# Patient Record
Sex: FEMALE | Race: BLACK OR AFRICAN AMERICAN | Employment: OTHER | RURAL
[De-identification: names, ages, dates, MRNs, and addresses within clinical notes are randomized per-mention and may not be internally consistent; named-entity substitution may affect disease eponyms.]

---

## 2017-01-25 ENCOUNTER — OFFICE VISIT (OUTPATIENT)
Dept: FAMILY MEDICINE CLINIC | Age: 82
End: 2017-01-25

## 2017-01-25 VITALS
TEMPERATURE: 97.9 F | HEIGHT: 56 IN | BODY MASS INDEX: 43.87 KG/M2 | RESPIRATION RATE: 16 BRPM | DIASTOLIC BLOOD PRESSURE: 72 MMHG | HEART RATE: 92 BPM | WEIGHT: 195 LBS | OXYGEN SATURATION: 97 % | SYSTOLIC BLOOD PRESSURE: 164 MMHG

## 2017-01-25 DIAGNOSIS — Z13.39 SCREENING FOR ALCOHOLISM: ICD-10-CM

## 2017-01-25 DIAGNOSIS — I48.0 PAROXYSMAL ATRIAL FIBRILLATION (HCC): ICD-10-CM

## 2017-01-25 DIAGNOSIS — E78.00 PURE HYPERCHOLESTEROLEMIA: ICD-10-CM

## 2017-01-25 DIAGNOSIS — Z00.00 ROUTINE GENERAL MEDICAL EXAMINATION AT A HEALTH CARE FACILITY: Primary | ICD-10-CM

## 2017-01-25 DIAGNOSIS — I10 ESSENTIAL HYPERTENSION: ICD-10-CM

## 2017-01-25 DIAGNOSIS — I25.10 CORONARY ARTERY DISEASE INVOLVING NATIVE CORONARY ARTERY OF NATIVE HEART WITHOUT ANGINA PECTORIS: ICD-10-CM

## 2017-01-25 DIAGNOSIS — Z13.31 SCREENING FOR DEPRESSION: ICD-10-CM

## 2017-01-25 DIAGNOSIS — Z79.01 ANTICOAGULANT LONG-TERM USE: ICD-10-CM

## 2017-01-25 PROBLEM — Z71.89 ADVANCE DIRECTIVE DISCUSSED WITH PATIENT: Status: ACTIVE | Noted: 2017-01-25

## 2017-01-25 RX ORDER — AMLODIPINE BESYLATE 5 MG/1
5 TABLET ORAL DAILY
Qty: 90 TAB | Refills: 3 | Status: SHIPPED | OUTPATIENT
Start: 2017-01-25 | End: 2017-08-04 | Stop reason: DRUGHIGH

## 2017-01-25 NOTE — PROGRESS NOTES
Blu Alexander is a 80 y.o. female presenting for/with: Annual Wellness Visit and Hip Pain    HPI:  Had acute influenza. Felt feverish, coughing, sore. Went to [facility] ED, had POS flu test for influenza A and was tx with tamiflu with rapid recovery. Back to normal now. R leg pain  Pt had intense pain to the R leg yesterday. Told was DJD after some xrays and an exam. Tx with a pain pill in ED (not sure what), ordered for tramadol (didn't fill) and told to f/u with us. Better today. Ran out of her norco 5's late last mo from #90 fill 2/2016. Hypertension. Blood pressures have been improving a little. Management at last visit included con't coreg 25 BID and lisinopril HCT to 2x 20/12.5 every day, and adding norvasc 2. Every day. Taking regularly, gina well. Current regimen: ACE inhibitor, beta-blocker, CCB, and thiazide diuretic. Symptoms include no sx lately. Patient denies chest pain, palpitations. Lab review:   Lab Results   Component Value Date/Time    Sodium 141 11/07/2016 12:04 PM    Potassium 4.2 11/07/2016 12:04 PM    Chloride 104 11/07/2016 12:04 PM    CO2 22 11/07/2016 12:04 PM    Glucose 85 11/07/2016 12:04 PM    BUN 28 11/07/2016 12:04 PM    Creatinine 0.86 11/07/2016 12:04 PM    BUN/Creatinine ratio 33 11/07/2016 12:04 PM    GFR est AA 71 11/07/2016 12:04 PM    GFR est non-AA 61 11/07/2016 12:04 PM    Calcium 9.2 11/07/2016 12:04 PM     CMP at [facility] showed GFRaa >60, Cr 1, Na+ a little low at 132. LFT's ok. Atrial fibrillation, intermittent. Current symptoms: none  Current control agent: B-blocker  Current anticoagulant: ASA 81mg every day and plavix 75mg every day. History of bleeding problems: GI bleed 9/2015, with drop in HCT.    Lab Results   Component Value Date/Time    WBC 7.4 03/08/2016 11:31 AM    HGB 12.8 03/08/2016 11:31 AM    HCT 39.7 03/08/2016 11:31 AM    PLATELET 853 80/74/1284 11:31 AM    MCV 96 03/08/2016 11:31 AM     H/H at [facility] ER 1/2017 13.3/40    CHD  No further hx bleed since 9/2015. Remains on ASA 81 and plavix (con't by cardio), b-blocker, statin, and diuretic. Trino well. No myalgias, arthralgias, unusual weakness. Does have increased risk of recurrent GI bleed on double antiplt, and is on pepcid for GI prophy. No sx lately. Lab Results   Component Value Date/Time    Cholesterol, total 122 03/08/2016 11:31 AM    HDL Cholesterol 65 03/08/2016 11:31 AM    LDL, calculated 45 03/08/2016 11:31 AM    VLDL, calculated 12 03/08/2016 11:31 AM    Triglyceride 59 03/08/2016 11:31 AM     Lab Results   Component Value Date/Time    ALT 21 03/08/2016 11:31 AM    AST 27 03/08/2016 11:31 AM    Alk. phosphatase 102 03/08/2016 11:31 AM    Bilirubin, total 0.4 03/08/2016 11:31 AM     LFT's normal on check at [address] [place] [address] 1/2017. PMH, SH, Medications/Allergies: reviewed, on chart. ROS:  Constitutional: No fever, chills or weight loss  Respiratory: No cough, SOB   CV: No chest pain, rare palpitations, no change.     Visit Vitals    /72    Pulse 92    Temp 97.9 °F (36.6 °C) (Oral)    Resp 16    Ht 4' 8\" (1.422 m)    Wt 195 lb (88.5 kg)    SpO2 97%    BMI 43.72 kg/m2     Wt Readings from Last 3 Encounters:   01/25/17 195 lb (88.5 kg)   11/07/16 198 lb (89.8 kg)   09/07/16 199 lb (90.3 kg)     BP Readings from Last 3 Encounters:   01/25/17 164/72   11/07/16 163/75   09/07/16 (!) 149/100     Physical Examination: General appearance - alert, well appearing, and in no distress  Mental status - alert, oriented to person, place, and time  Eyes - pupils equal and reactive, extraocular eye movements intact  ENT - bilateral external ears and nose normal. Normal lips  Neck - supple, no significant adenopathy, no thyromegaly or mass  Lymphatics - no palpable lymphadenopathy, no hepatosplenomegaly  Chest - clear to auscultation, no wheezes, rales or rhonchi, symmetric air entry  Heart - normal rate, regular rhythm, normal S1, S2, no murmurs, rubs, clicks or gallops  Extremities - peripheral pulses normal, trace pedal edema, no clubbing or cyanosis    A/P:  HTN  Still not in goal. Boost norvasc to 5mg daily. Con't coreg 25 BID, lisinopril HCT 20/12.5mg 2qday, . CHD and HLD  Stable since last visit, no anemia. gina meds well. Work on RF reduction. Plan lipids in late winter/early spring 2017. Afib with hx GI bleed hx, resolved. Blood count good at last check. Has been 2yr since last stent placed. Con't pepcid for GI prophy. Monitor. DJD  Doing ok lately. using norco 5/325 sparingly. Has plenty from #90 fill 9/16. If want therapy for her hip, plan refer to [facility] per pt pref. F/U 2mo.    ______________________________________________________________________    Hugo Contreras is a 80 y.o. female and presents for annual Medicare Wellness Visit. Problem List: Reviewed with patient and discussed risk factors. Patient Active Problem List   Diagnosis Code    Degenerative arthritis of left shoulder region M19.012    S/P hysterectomy Z90.710    Hyperlipidemia E78.5    HTN (hypertension) I10    OA (osteoarthritis) M19.90    Lumbago with sciatica M54.40    Obesity E66.9    DVT (deep venous thrombosis) (HCC) I82.409    Coronary artery disease involving native coronary artery without angina pectoris I25.10    Paroxysmal atrial fibrillation (HCC) I48.0    Anticoagulant long-term use Z79.01       Current medical providers:  Patient Care Team:  Dino Libman, MD as PCP - General (Family Practice)    PMH, , Medications/Allergies: reviewed, on chart. Female Alcohol Screening: On any occasion during the past 3 months, have you had more than 3 drinks containing alcohol? No    Do you average more than 7 drinks per week?   No  ROS:  Constitutional: No fever, chills or weight loss  Respiratory: No cough, SOB   CV: No chest pain or Palpitations    Objective:  Visit Vitals    /72    Pulse 92    Temp 97.9 °F (36.6 °C) (Oral)    Resp 16    Ht 4' 8\" (1.422 m)    Wt 195 lb (88.5 kg)    SpO2 97%    BMI 43.72 kg/m2    Body mass index is 43.72 kg/(m^2). Assessment of cognitive impairment: Alert and oriented x 3    Depression Screen:   PHQ 2 / 9, over the last two weeks 1/25/2017   Little interest or pleasure in doing things Not at all   Feeling down, depressed or hopeless Not at all   Total Score PHQ 2 0       Fall Risk Assessment:    Fall Risk Assessment, last 12 mths 1/25/2017   Able to walk? Yes   Fall in past 12 months? No       Functional Ability:   Does the patient exhibit a steady gait? yes   How long did it take the patient to get up and walk from a sitting position? 2s   Is the patient self reliant?  (ie can do own laundry, meals, household chores)  yes     Does the patient handle his/her own medications? yes     Does the patient handle his/her own money? yes     Is the patients home safe (ie good lighting, handrails on stairs and bath, etc.)? yes     Did you notice or did patient express any hearing difficulties? no     Did you notice or did patient express any vision difficulties? no       Advance Care Planning:   Patient was offered the opportunity to discuss advance care planning:  yes     Does patient have an Advance Directive:  no   If no, did you provide information on Caring Connections? yes     Plan:      Orders Placed This Encounter    Depression Screen Annual    amLODIPine (NORVASC) 5 mg tablet       Health Maintenance   Topic Date Due    DTaP/Tdap/Td series (1 - Tdap) 03/05/1951    ZOSTER VACCINE AGE 60>  03/05/1990    GLAUCOMA SCREENING Q2Y  03/05/1995    OSTEOPOROSIS SCREENING (DEXA)  03/05/1995    Pneumococcal 65+ Low/Medium Risk (2 of 2 - PPSV23) 12/21/2016    MEDICARE YEARLY EXAM  12/21/2016    INFLUENZA AGE 9 TO ADULT  Completed       *Patient verbalized understanding and agreement with the plan. A copy of the After Visit Summary with personalized health plan was given to the patient today.

## 2017-01-25 NOTE — MR AVS SNAPSHOT
Visit Information Date & Time Provider Department Dept. Phone Encounter #  
 1/25/2017 10:10 AM Jody Cherry MD [facility] [id number] Follow-up Instructions Return in about 2 months (around 3/25/2017). Follow-up and Disposition History Upcoming Health Maintenance Date Due DTaP/Tdap/Td series (1 - Tdap) 3/5/1951 ZOSTER VACCINE AGE 60> 3/5/1990 GLAUCOMA SCREENING Q2Y 3/5/1995 OSTEOPOROSIS SCREENING (DEXA) 3/5/1995 Pneumococcal 65+ Low/Medium Risk (2 of 2 - PPSV23) 12/21/2016 MEDICARE YEARLY EXAM 12/21/2016 Allergies as of 1/25/2017  Review Complete On: 1/25/2017 By: Jody Cherry MD  
 No Known Allergies Current Immunizations  Never Reviewed Name Date Influenza High Dose Vaccine PF 11/7/2016 Influenza Vaccine 10/13/2015, 10/6/2015 Pneumococcal Conjugate (PCV-13) 12/21/2015 Not reviewed this visit You Were Diagnosed With   
  
 Codes Comments Routine general medical examination at a health care facility    -  Primary ICD-10-CM: Z00.00 ICD-9-CM: V70.0 Anticoagulant long-term use     ICD-10-CM: Z79.01 
ICD-9-CM: V58.61 Essential hypertension     ICD-10-CM: I10 
ICD-9-CM: 401.9 Pure hypercholesterolemia     ICD-10-CM: E78.00 ICD-9-CM: 272.0 Paroxysmal atrial fibrillation (HCC)     ICD-10-CM: I48.0 ICD-9-CM: 427.31 Coronary artery disease involving native coronary artery of native heart without angina pectoris     ICD-10-CM: I25.10 ICD-9-CM: 414.01 Screening for alcoholism     ICD-10-CM: Z13.89 ICD-9-CM: V79.1 Screening for depression     ICD-10-CM: Z13.89 ICD-9-CM: V79.0 Vitals BP Pulse Temp Resp Height(growth percentile) Weight(growth percentile) 164/72 92 97.9 °F (36.6 °C) (Oral) 16 4' 8\" (1.422 m) 195 lb (88.5 kg) SpO2 BMI OB Status Smoking Status 97% 43.72 kg/m2 Hysterectomy Former Smoker BMI and BSA Data Body Mass Index Body Surface Area 43.72 kg/m 2 1.87 m 2 [facility] [phone] [place] [address] [phone] Your Updated Medication List  
  
   
This list is accurate as of: 1/25/17 11:14 AM.  Always use your most recent med list. amLODIPine 5 mg tablet Commonly known as:  Voncile Crowheart Take 1 Tab by mouth daily. Indications: pressure and heart  
  
 aspirin delayed-release 81 mg tablet Take  by mouth daily. atorvastatin 40 mg tablet Commonly known as:  LIPITOR Take  by mouth daily. carvedilol 25 mg tablet Commonly known as:  COREG  
take 1 tablet by mouth twice a day with meals  
  
 clopidogrel 75 mg Tab Commonly known as:  PLAVIX Take 75 mg by mouth daily. famotidine 20 mg tablet Commonly known as:  PEPCID Take 1 Tab by mouth two (2) times a day. ferrous sulfate 325 mg (65 mg iron) EC tablet Commonly known as:  IRON Take 1 Tab by mouth daily (with breakfast). lisinopril-hydroCHLOROthiazide 20-12.5 mg per tablet Commonly known as:  Yosef Posey Take 2 Tabs by mouth daily. Indications: heart and pressure, replaces plain thiazide and plain lisinopril  
  
 nitroglycerin 0.4 mg SL tablet Commonly known as:  NITROSTAT  
by SubLINGual route every five (5) minutes as needed for Chest Pain. Prescriptions Sent to Pharmacy Refills  
 amLODIPine (NORVASC) 5 mg tablet 3 Sig: Take 1 Tab by mouth daily. Indications: pressure and heart Class: Normal  
 Pharmacy: [facility] [id number] [address] Ph #: [phone] Route: Oral  
  
[facility] Performed the [id number] Follow-up Instructions Return in about 2 months (around 3/25/2017). Patient Instructions Boost your amlodipine to 5mg daily. You can use up the 2.5mg pills 2 at a time for now. Try to get out and walk for a few minutes every day, it will help your energy. Introducing [facility] [place] [facility]! Phil Netta introduces [employer] patient portal. Now you can access parts of your medical record, email your doctor's office, and request medication refills online. 1. In your internet browser, go to [email or web address] 2. Click on the First Time User? Click Here link in the Sign In box. You will see the New Member Sign Up page. 3. Enter your [employer] Access Code exactly as it appears below. You will not need to use this code after youve completed the sign-up process. If you do not sign up before the expiration date, you must request a new code. · [employer] Access Code: [email or web address] Expires: 4/25/2017  9:48 AM 
 
4. Enter the last four digits of your Social Security Number (xxxx) and Date of Birth (mm/dd/yyyy) as indicated and click Submit. You will be taken to the next sign-up page. 5. Create a [employer] ID. This will be your [employer] login ID and cannot be changed, so think of one that is secure and easy to remember. 6. Create a [employer] password. You can change your password at any time. 7. Enter your Password Reset Question and Answer. This can be used at a later time if you forget your password. 8. Enter your e-mail address. You will receive e-mail notification when new information is available in [address] 9. Click Sign Up. You can now view and download portions of your medical record. 10. Click the Download Summary menu link to download a portable copy of your medical information. If you have questions, please visit the Frequently Asked Questions section of the [employer] website. Remember, [employer] is NOT to be used for urgent needs. For medical emergencies, dial 911. Now available from your iPhone and Android! Please provide this summary of care documentation to your next provider. Your primary care clinician is listed as Marizol Strickland. If you have any questions after today's visit, please call [phone].

## 2017-01-25 NOTE — PATIENT INSTRUCTIONS
Boost your amlodipine to 5mg daily. You can use up the 2.5mg pills 2 at a time for now. Try to get out and walk for a few minutes every day, it will help your energy.

## 2017-01-25 NOTE — ACP (ADVANCE CARE PLANNING)
Discussed ACP with pt and was given documents to review. Advised pt to discuss with spouse and have them to come on the visit to discuss ACP with provider.

## 2017-03-07 ENCOUNTER — DOCUMENTATION ONLY (OUTPATIENT)
Dept: FAMILY MEDICINE CLINIC | Age: 82
End: 2017-03-07

## 2017-03-27 ENCOUNTER — OFFICE VISIT (OUTPATIENT)
Dept: FAMILY MEDICINE CLINIC | Age: 82
End: 2017-03-27

## 2017-03-27 VITALS
DIASTOLIC BLOOD PRESSURE: 73 MMHG | HEIGHT: 55 IN | RESPIRATION RATE: 18 BRPM | BODY MASS INDEX: 44.9 KG/M2 | WEIGHT: 194 LBS | HEART RATE: 82 BPM | TEMPERATURE: 96.5 F | OXYGEN SATURATION: 95 % | SYSTOLIC BLOOD PRESSURE: 153 MMHG

## 2017-03-27 DIAGNOSIS — I25.10 CORONARY ARTERY DISEASE INVOLVING NATIVE CORONARY ARTERY OF NATIVE HEART WITHOUT ANGINA PECTORIS: Primary | ICD-10-CM

## 2017-03-27 DIAGNOSIS — M19.90 ARTHRITIS: ICD-10-CM

## 2017-03-27 DIAGNOSIS — I10 ESSENTIAL HYPERTENSION: ICD-10-CM

## 2017-03-27 DIAGNOSIS — E78.00 PURE HYPERCHOLESTEROLEMIA: ICD-10-CM

## 2017-03-27 RX ORDER — ATORVASTATIN CALCIUM 40 MG/1
40 TABLET, FILM COATED ORAL DAILY
Qty: 30 TAB | Refills: 11 | Status: SHIPPED | OUTPATIENT
Start: 2017-03-27 | End: 2018-07-06 | Stop reason: SDUPTHER

## 2017-03-27 RX ORDER — SPIRONOLACTONE 25 MG/1
12.5 TABLET ORAL DAILY
Qty: 30 TAB | Refills: 11 | Status: SHIPPED | OUTPATIENT
Start: 2017-03-27 | End: 2017-06-02 | Stop reason: SDUPTHER

## 2017-03-27 RX ORDER — HYDROCODONE BITARTRATE AND ACETAMINOPHEN 5; 325 MG/1; MG/1
TABLET ORAL
COMMUNITY
End: 2017-03-27 | Stop reason: ALTCHOICE

## 2017-03-27 RX ORDER — ACETAMINOPHEN AND CODEINE PHOSPHATE 300; 30 MG/1; MG/1
1-2 TABLET ORAL
Qty: 120 TAB | Refills: 2 | Status: SHIPPED | OUTPATIENT
Start: 2017-03-27 | End: 2017-10-05 | Stop reason: ALTCHOICE

## 2017-03-27 NOTE — MR AVS SNAPSHOT
Visit Information Date & Time Provider Department Dept. Phone Encounter #  
 3/27/2017  9:10 AM Saul Garcia MD [id number] [place] [id number] Follow-up Instructions Return in about 2 months (around 5/27/2017). Follow-up and Disposition History Upcoming Health Maintenance Date Due DTaP/Tdap/Td series (1 - Tdap) 3/5/1951 ZOSTER VACCINE AGE 60> 3/5/1990 GLAUCOMA SCREENING Q2Y 3/5/1995 OSTEOPOROSIS SCREENING (DEXA) 3/5/1995 Pneumococcal 65+ Low/Medium Risk (2 of 2 - PPSV23) 12/21/2016 MEDICARE YEARLY EXAM 1/26/2018 Allergies as of 3/27/2017  Review Complete On: 3/27/2017 By: Saul Garcia MD  
 No Known Allergies Current Immunizations  Reviewed on 3/27/2017 Name Date Influenza High Dose Vaccine PF 11/7/2016 Influenza Vaccine 10/13/2015, 10/6/2015 Pneumococcal Conjugate (PCV-13) 12/21/2015 Reviewed by Hans Lundberg on 3/27/2017 at  9:05 AM  
You Were Diagnosed With   
  
 Codes Comments Coronary artery disease involving native coronary artery of native heart without angina pectoris    -  Primary ICD-10-CM: I25.10 ICD-9-CM: 414.01 Essential hypertension     ICD-10-CM: I10 
ICD-9-CM: 401.9 Pure hypercholesterolemia     ICD-10-CM: E78.00 ICD-9-CM: 272.0 Arthritis     ICD-10-CM: M19.90 ICD-9-CM: 716.90 Vitals BP Pulse Temp Resp Height(growth percentile) Weight(growth percentile) 153/73 (BP 1 Location: Right arm, BP Patient Position: Sitting) 82 96.5 °F (35.8 °C) (Oral) 18 4' 6\" (1.372 m) 194 lb (88 kg) SpO2 BMI OB Status Smoking Status 95% 46.78 kg/m2 Hysterectomy Former Smoker BMI and BSA Data Body Mass Index Body Surface Area 46.78 kg/m 2 1.83 m 2 [facility] [phone] [place] [address] [phone] Your Updated Medication List  
  
   
 This list is accurate as of: 3/27/17  9:52 AM.  Always use your most recent med list.  
  
  
  
  
 acetaminophen-codeine 300-30 mg per tablet Commonly known as:  TYLENOL #3 Take 1-2 Tabs by mouth two (2) times daily as needed for Pain. Max Daily Amount: 4 Tabs. Indications: arthritis  
  
 amLODIPine 5 mg tablet Commonly known as:  Kent Royals Take 1 Tab by mouth daily. Indications: pressure and heart  
  
 aspirin delayed-release 81 mg tablet Take  by mouth daily. atorvastatin 40 mg tablet Commonly known as:  LIPITOR Take 1 Tab by mouth daily. Indications: cholesterol and heart  
  
 carvedilol 25 mg tablet Commonly known as:  COREG  
take 1 tablet by mouth twice a day with meals  
  
 clopidogrel 75 mg Tab Commonly known as:  PLAVIX Take 75 mg by mouth daily. famotidine 20 mg tablet Commonly known as:  PEPCID Take 1 Tab by mouth two (2) times a day. lisinopril-hydroCHLOROthiazide 20-12.5 mg per tablet Commonly known as:  Izola Boots Take 2 Tabs by mouth daily. Indications: heart and pressure, replaces plain thiazide and plain lisinopril  
  
 nitroglycerin 0.4 mg SL tablet Commonly known as:  NITROSTAT  
by SubLINGual route every five (5) minutes as needed for Chest Pain. spironolactone 25 mg tablet Commonly known as:  ALDACTONE Take 0.5 Tabs by mouth daily. Indications: pressure and heart Prescriptions Printed Refills  
 acetaminophen-codeine (TYLENOL #3) 300-30 mg per tablet 2 Sig: Take 1-2 Tabs by mouth two (2) times daily as needed for Pain. Max Daily Amount: 4 Tabs. Indications: arthritis Class: Print Route: Oral  
  
Prescriptions Sent to Pharmacy Refills  
 spironolactone (ALDACTONE) 25 mg tablet 11 Sig: Take 0.5 Tabs by mouth daily. Indications: pressure and heart Class: Normal  
 Pharmacy: [facility] [id number] #: [phone]  Route: Oral  
 atorvastatin (LIPITOR) 40 mg tablet 11 Sig: Take 1 Tab by mouth daily. Indications: cholesterol and heart Class: Normal  
 Pharmacy: [facility] [id number]  #: [phone] Route: Oral  
  
Follow-up Instructions Return in about 2 months (around 5/27/2017). Introducing [facility] [place] [facility]! Marei Mercado introduces [employer] patient portal. Now you can access parts of your medical record, email your doctor's office, and request medication refills online. 1. In your internet browser, go to [email or web address] 2. Click on the First Time User? Click Here link in the Sign In box. You will see the New Member Sign Up page. 3. Enter your [employer] Access Code exactly as it appears below. You will not need to use this code after youve completed the sign-up process. If you do not sign up before the expiration date, you must request a new code. · [employer] Access Code: [email or web address] Expires: 4/25/2017 10:48 AM 
 
4. Enter the last four digits of your Social Security Number (xxxx) and Date of Birth (mm/dd/yyyy) as indicated and click Submit. You will be taken to the next sign-up page. 5. Create a [employer] ID. This will be your [employer] login ID and cannot be changed, so think of one that is secure and easy to remember. 6. Create a [employer] password. You can change your password at any time. 7. Enter your Password Reset Question and Answer. This can be used at a later time if you forget your password. 8. Enter your e-mail address. You will receive e-mail notification when new information is available in [address] 9. Click Sign Up. You can now view and download portions of your medical record. 10. Click the Download Summary menu link to download a portable copy of your medical information. If you have questions, please visit the Frequently Asked Questions section of the [employer] website.  Remember, [employer] is NOT to be used for urgent needs. For medical emergencies, dial 911. Now available from your iPhone and Android! Please provide this summary of care documentation to your next provider. Your primary care clinician is listed as Issa Strickland. If you have any questions after today's visit, please call [phone].

## 2017-03-27 NOTE — PROGRESS NOTES
Ridge Bosch is a 80 y.o. female presenting for/with:    Knee Pain (both knees) and Medication Refill (Hydrocodone )    HPI:  R leg pain  Pt having bothersome arthritis pain to bilat knees over past month or so. Ran out of norco. Dx DJD on xrays in past.     Hypertension. Blood pressures have been improving a little. Management at last visit included con't coreg 25 BID and lisinopril HCT to 2x 20/12.5 every day, and boosting norvasc to 5mg. Every day. Taking regularly, gina well. Current regimen: ACE inhibitor, beta-blocker, CCB, and thiazide diuretic. Symptoms include no sx lately. Patient denies chest pain, palpitations. Lab review:   Lab Results   Component Value Date/Time    Sodium 141 11/07/2016 12:04 PM    Potassium 4.2 11/07/2016 12:04 PM    Chloride 104 11/07/2016 12:04 PM    CO2 22 11/07/2016 12:04 PM    Glucose 85 11/07/2016 12:04 PM    BUN 28 11/07/2016 12:04 PM    Creatinine 0.86 11/07/2016 12:04 PM    BUN/Creatinine ratio 33 11/07/2016 12:04 PM    GFR est AA 71 11/07/2016 12:04 PM    GFR est non-AA 61 11/07/2016 12:04 PM    Calcium 9.2 11/07/2016 12:04 PM     CMP at [facility] showed GFRaa >60, Cr 1, Na+ a little low at 132. LFT's ok. Atrial fibrillation, intermittent. Current symptoms: none  Current control agent: B-blocker  Current anticoagulant: ASA 81mg every day and plavix 75mg every day. History of bleeding problems: GI bleed 9/2015, with drop in HCT. Lab Results   Component Value Date/Time    WBC 7.4 03/08/2016 11:31 AM    HGB 12.8 03/08/2016 11:31 AM    HCT 39.7 03/08/2016 11:31 AM    PLATELET 165 15/95/8533 11:31 AM    MCV 96 03/08/2016 11:31 AM     H/H at [facility] ER 1/2017 13.3/40    CHD  No further hx bleed since 9/2015. Remains on ASA 81 and plavix (con't by cardio), b-blocker, statin, and diuretic. Gina well. No myalgias, arthralgias, unusual weakness. Does have increased risk of recurrent GI bleed on double antiplt, and is on pepcid for GI prophy. No sx lately.   Lab Results Component Value Date/Time    Cholesterol, total 122 03/08/2016 11:31 AM    HDL Cholesterol 65 03/08/2016 11:31 AM    LDL, calculated 45 03/08/2016 11:31 AM    VLDL, calculated 12 03/08/2016 11:31 AM    Triglyceride 59 03/08/2016 11:31 AM     Lab Results   Component Value Date/Time    ALT (SGPT) 21 03/08/2016 11:31 AM    AST (SGOT) 27 03/08/2016 11:31 AM    Alk. phosphatase 102 03/08/2016 11:31 AM    Bilirubin, total 0.4 03/08/2016 11:31 AM     LFT's normal on check at [address] [place] [address] 1/2017. PMH, SH, Medications/Allergies: reviewed, on chart. ROS:  Constitutional: No fever, chills or weight loss  Respiratory: No cough, SOB   CV: No chest pain, rare palpitations, no change. Visit Vitals    /73 (BP 1 Location: Right arm, BP Patient Position: Sitting)  Comment (BP 1 Location): machine    Pulse 82    Temp 96.5 °F (35.8 °C) (Oral)    Resp 18    Ht 4' 6\" (1.372 m)    Wt 194 lb (88 kg)    SpO2 95%    BMI 46.78 kg/m2     Wt Readings from Last 3 Encounters:   03/27/17 194 lb (88 kg)   01/25/17 195 lb (88.5 kg)   11/07/16 198 lb (89.8 kg)     BP Readings from Last 3 Encounters:   03/27/17 153/73   01/25/17 164/72   11/07/16 163/75     Physical Examination: General appearance - alert, well appearing, and in no distress  Mental status - alert, oriented to person, place, and time  Eyes - pupils equal and reactive, extraocular eye movements intact  ENT - bilateral external ears and nose normal. Normal lips  Neck - supple, no significant adenopathy, no thyromegaly or mass  Lymphatics - no palpable lymphadenopathy, no hepatosplenomegaly  Chest - clear to auscultation, no wheezes, rales or rhonchi, symmetric air entry  Heart - normal rate, regular rhythm, normal S1, S2, no murmurs, rubs, clicks or gallops  Extremities - peripheral pulses normal, trace pedal edema, no clubbing or cyanosis    A/P:  HTN  Still not in goal, but better. Boost norvasc to 5mg daily.  Con't coreg 25 BID, lisinopril HCT 20/12.5mg 2qday, plan BMP in May . CHD and HLD  Stable since last visit, no anemia. gina meds well. Work on RF reduction. Plan lipids next visit in May 2017. Afib with hx GI bleed hx, resolved. Blood count good at last check. Has been 2yr since last stent placed. Con't pepcid for GI prophy. Monitor. DJD  More sx lately. Ran out of norco 5/325 finally. Try change to T3's, #120 given    F/U 3mo.

## 2017-04-28 ENCOUNTER — OFFICE VISIT (OUTPATIENT)
Dept: FAMILY MEDICINE CLINIC | Age: 82
End: 2017-04-28

## 2017-04-28 VITALS
WEIGHT: 193.19 LBS | DIASTOLIC BLOOD PRESSURE: 69 MMHG | HEART RATE: 77 BPM | BODY MASS INDEX: 44.71 KG/M2 | HEIGHT: 55 IN | RESPIRATION RATE: 20 BRPM | TEMPERATURE: 98.7 F | SYSTOLIC BLOOD PRESSURE: 142 MMHG | OXYGEN SATURATION: 96 %

## 2017-04-28 DIAGNOSIS — I25.10 CORONARY ARTERY DISEASE INVOLVING NATIVE CORONARY ARTERY OF NATIVE HEART WITHOUT ANGINA PECTORIS: ICD-10-CM

## 2017-04-28 DIAGNOSIS — Z79.01 ANTICOAGULANT LONG-TERM USE: Primary | ICD-10-CM

## 2017-04-28 DIAGNOSIS — I10 ESSENTIAL HYPERTENSION: ICD-10-CM

## 2017-04-28 DIAGNOSIS — I48.0 PAROXYSMAL ATRIAL FIBRILLATION (HCC): ICD-10-CM

## 2017-04-28 DIAGNOSIS — I82.492 DEEP VEIN THROMBOSIS (DVT) OF OTHER VEIN OF LEFT LOWER EXTREMITY: ICD-10-CM

## 2017-04-28 RX ORDER — FAMOTIDINE 20 MG/1
20 TABLET, FILM COATED ORAL 2 TIMES DAILY
Qty: 60 TAB | Refills: 11 | Status: SHIPPED | OUTPATIENT
Start: 2017-04-28 | End: 2018-04-08 | Stop reason: SDUPTHER

## 2017-04-28 NOTE — PATIENT INSTRUCTIONS
Leg and Ankle Edema: Care Instructions  Your Care Instructions  Swelling in the legs, ankles, and feet is called edema. It is common after you sit or stand for a while. Long plane flights or car rides often cause swelling in the legs and feet. You may also have swelling if you have to stand for long periods of time at your job. Problems with the veins in the legs (varicose veins) and changes in hormones can also cause swelling. Sometimes the swelling in the ankles and feet is caused by a more serious problem, such as heart failure, infection, blood clots, or liver or kidney disease. Follow-up care is a key part of your treatment and safety. Be sure to make and go to all appointments, and call your doctor if you are having problems. Its also a good idea to know your test results and keep a list of the medicines you take. How can you care for yourself at home? · If your doctor gave you medicine, take it as prescribed. Call your doctor if you think you are having a problem with your medicine. · Whenever you are resting, raise your legs up. Try to keep the swollen area higher than the level of your heart. · Take breaks from standing or sitting in one position. ¨ Walk around to increase the blood flow in your lower legs. ¨ Move your feet and ankles often while you stand, or tighten and relax your leg muscles. · Wear support stockings. Put them on in the morning, before swelling gets worse. · Eat a balanced diet. Lose weight if you need to. · Limit the amount of salt (sodium) in your diet. Salt holds fluid in the body and may increase swelling. When should you call for help? Call 911 anytime you think you may need emergency care. For example, call if:  · You have symptoms of a blood clot in your lung (called a pulmonary embolism). These may include:  ¨ Sudden chest pain. ¨ Trouble breathing. ¨ Coughing up blood.   Call your doctor now or seek immediate medical care if:  · You have signs of a blood clot, such as:  ¨ Pain in your calf, back of the knee, thigh, or groin. ¨ Redness and swelling in your leg or groin. · You have symptoms of infection, such as:  ¨ Increased pain, swelling, warmth, or redness. ¨ Red streaks or pus. ¨ A fever. Watch closely for changes in your health, and be sure to contact your doctor if:  · Your swelling is getting worse. · You have new or worsening pain in your legs. · You do not get better as expected. Where can you learn more? Go to http://garrett-fay.info/. Enter N576 in the search box to learn more about \"Leg and Ankle Edema: Care Instructions. \"  Current as of: May 27, 2016  Content Version: 11.2  © 1329-0798 [employer]. Care instructions adapted under license by [employer] (which disclaims liability or warranty for this information). If you have questions about a medical condition or this instruction, always ask your healthcare professional. [place] [address] any warranty or liability for your use of this information.

## 2017-04-28 NOTE — MR AVS SNAPSHOT
Visit Information Date & Time Provider Department Dept. Phone Encounter #  
 4/28/2017 11:10 AM Chente Salazar MD [address] [place] [address] [id number] Follow-up Instructions Return in about 3 months (around 7/28/2017). Follow-up and Disposition History Your Appointments 5/26/2017  9:10 AM  
ESTABLISHED PATIENT with Chente Salazar MD  
[facility] Appt Note: 2  month f/u  
 [facility] [address] [facility],5Th [address] [id number] [phone]  
  
   
 [facility] [address] [facility],5Th Floor [address] Upcoming Health Maintenance Date Due DTaP/Tdap/Td series (1 - Tdap) 3/5/1951 ZOSTER VACCINE AGE 60> 3/5/1990 GLAUCOMA SCREENING Q2Y 3/5/1995 OSTEOPOROSIS SCREENING (DEXA) 3/5/1995 Pneumococcal 65+ Low/Medium Risk (2 of 2 - PPSV23) 12/21/2016 MEDICARE YEARLY EXAM 1/26/2018 Allergies as of 4/28/2017  Review Complete On: 4/28/2017 By: Chente Salazar MD  
 No Known Allergies Current Immunizations  Reviewed on 3/27/2017 Name Date Influenza High Dose Vaccine PF 11/7/2016 Influenza Vaccine 10/13/2015, 10/6/2015 Pneumococcal Conjugate (PCV-13) 12/21/2015 Not reviewed this visit You Were Diagnosed With   
  
 Codes Comments Anticoagulant long-term use    -  Primary ICD-10-CM: Z79.01 
ICD-9-CM: V58.61 Coronary artery disease involving native coronary artery of native heart without angina pectoris     ICD-10-CM: I25.10 ICD-9-CM: 414.01 Deep vein thrombosis (DVT) of other vein of left lower extremity (HCC)     ICD-10-CM: V01.737 ICD-9-CM: 453.40 Essential hypertension     ICD-10-CM: I10 
ICD-9-CM: 401.9 Paroxysmal atrial fibrillation (HCC)     ICD-10-CM: I48.0 ICD-9-CM: 427.31 Vitals BP Pulse Temp Resp Height(growth percentile) Weight(growth percentile) 142/69 77 98.7 °F (37.1 °C) (Oral) 20 4' 6\" (1.372 m) 193 lb 3 oz (87.6 kg) SpO2 BMI OB Status Smoking Status 96% 46.58 kg/m2 Hysterectomy Former Smoker BMI and BSA Data Body Mass Index Body Surface Area 46.58 kg/m 2 1.83 m 2 Preferred Pharmacy Pharmacy Name Phone [facility] [address] [phone] Your Updated Medication List  
  
   
This list is accurate as of: 4/28/17 12:04 PM.  Always use your most recent med list.  
  
  
  
  
 acetaminophen-codeine 300-30 mg per tablet Commonly known as:  TYLENOL #3 Take 1-2 Tabs by mouth two (2) times daily as needed for Pain. Max Daily Amount: 4 Tabs. Indications: arthritis  
  
 amLODIPine 5 mg tablet Commonly known as:  Myra Half Take 1 Tab by mouth daily. Indications: pressure and heart  
  
 aspirin delayed-release 81 mg tablet Take  by mouth daily. atorvastatin 40 mg tablet Commonly known as:  LIPITOR Take 1 Tab by mouth daily. Indications: cholesterol and heart  
  
 carvedilol 25 mg tablet Commonly known as:  COREG  
take 1 tablet by mouth twice a day with meals  
  
 clopidogrel 75 mg Tab Commonly known as:  PLAVIX Take 75 mg by mouth daily. famotidine 20 mg tablet Commonly known as:  PEPCID Take 1 Tab by mouth two (2) times a day. lisinopril-hydroCHLOROthiazide 20-12.5 mg per tablet Commonly known as:  Wynetta Nose Take 2 Tabs by mouth daily. Indications: heart and pressure, replaces plain thiazide and plain lisinopril  
  
 nitroglycerin 0.4 mg SL tablet Commonly known as:  NITROSTAT  
by SubLINGual route every five (5) minutes as needed for Chest Pain. spironolactone 25 mg tablet Commonly known as:  ALDACTONE Take 0.5 Tabs by mouth daily. Indications: pressure and heart Prescriptions Sent to Pharmacy Refills  
 famotidine (PEPCID) 20 mg tablet 11 Sig: Take 1 Tab by mouth two (2) times a day.   
 Class: Normal  
 [facility] [address] #: [phone] Route: Oral  
  
We Performed the Following LIPID PANEL [20835 CPT(R)] METABOLIC PANEL, BASIC [84711 CPT(R)] Follow-up Instructions Return in about 3 months (around 7/28/2017). Patient Instructions Leg and Ankle Edema: Care Instructions Your Care Instructions Swelling in the legs, ankles, and feet is called edema. It is common after you sit or stand for a while. Long plane flights or car rides often cause swelling in the legs and feet. You may also have swelling if you have to stand for long periods of time at your job. Problems with the veins in the legs (varicose veins) and changes in hormones can also cause swelling. Sometimes the swelling in the ankles and feet is caused by a more serious problem, such as heart failure, infection, blood clots, or liver or kidney disease. Follow-up care is a key part of your treatment and safety. Be sure to make and go to all appointments, and call your doctor if you are having problems. Its also a good idea to know your test results and keep a list of the medicines you take. How can you care for yourself at home? · If your doctor gave you medicine, take it as prescribed. Call your doctor if you think you are having a problem with your medicine. · Whenever you are resting, raise your legs up. Try to keep the swollen area higher than the level of your heart. · Take breaks from standing or sitting in one position. ¨ Walk around to increase the blood flow in your lower legs. ¨ Move your feet and ankles often while you stand, or tighten and relax your leg muscles. · Wear support stockings. Put them on in the morning, before swelling gets worse. · Eat a balanced diet. Lose weight if you need to. · Limit the amount of salt (sodium) in your diet. Salt holds fluid in the body and may increase swelling. When should you call for help? Call 911 anytime you think you may need emergency care. For example, call if: 
· You have symptoms of a blood clot in your lung (called a pulmonary embolism). These may include: 
¨ Sudden chest pain. ¨ Trouble breathing. ¨ Coughing up blood. Call your doctor now or seek immediate medical care if: 
· You have signs of a blood clot, such as: 
¨ Pain in your calf, back of the knee, thigh, or groin. ¨ Redness and swelling in your leg or groin. · You have symptoms of infection, such as: 
¨ Increased pain, swelling, warmth, or redness. ¨ Red streaks or pus. ¨ A fever. Watch closely for changes in your health, and be sure to contact your doctor if: 
· Your swelling is getting worse. · You have new or worsening pain in your legs. · You do not get better as expected. Where can you learn more? Go to http://garrett-fay.info/. Enter D915 in the search box to learn more about \"Leg and Ankle Edema: Care Instructions. \" Current as of: May 27, 2016 Content Version: 11.2 © 2575-1060 [employer]. Care instructions adapted under license by [employer] (which disclaims liability or warranty for this information). If you have questions about a medical condition or this instruction, always ask your healthcare professional. [address] any warranty or liability for your use of this information. Introducing hospitals & HEALTH SERVICES! Steff Rankin introduces [employer] patient portal. Now you can access parts of your medical record, email your doctor's office, and request medication refills online. 1. In your internet browser, go to [email or web address] 2. Click on the First Time User? Click Here link in the Sign In box. You will see the New Member Sign Up page. 3. Enter your [employer] Access Code exactly as it appears below. You will not need to use this code after youve completed the sign-up process.  If you do not sign up before the expiration date, you must request a new code. · [employer] Access Code: [email or web address] Expires: 7/27/2017 12:04 PM 
 
4. Enter the last four digits of your Social Security Number (xxxx) and Date of Birth (mm/dd/yyyy) as indicated and click Submit. You will be taken to the next sign-up page. 5. Create a [employer] ID. This will be your [employer] login ID and cannot be changed, so think of one that is secure and easy to remember. 6. Create a [employer] password. You can change your password at any time. 7. Enter your Password Reset Question and Answer. This can be used at a later time if you forget your password. 8. Enter your e-mail address. You will receive e-mail notification when new information is available in [address] 9. Click Sign Up. You can now view and download portions of your medical record. 10. Click the Download Summary menu link to download a portable copy of your medical information. If you have questions, please visit the Frequently Asked Questions section of the [employer] website. Remember, [employer] is NOT to be used for urgent needs. For medical emergencies, dial 911. Now available from your iPhone and Android! Please provide this summary of care documentation to your next provider. Your primary care clinician is listed as Rosie Strickland. If you have any questions after today's visit, please call [phone].

## 2017-04-28 NOTE — PROGRESS NOTES
Deanne Guerra is a 80 y.o. female presenting for/with: Ankle swelling (BOTH ANKLES)    HPI:  Hypertension. Blood pressures have been improving. Management at last visit included boost norvasc to 5mg. Taking regularly, gina well. Current regimen: ACE inhibitor, beta-blocker, CCB, and thiazide diuretic. Symptoms include mild edema lately bilat to ankles and lower shins. Patient denies chest pain, palpitations. Lab review:   Lab Results   Component Value Date/Time    Sodium 141 11/07/2016 12:04 PM    Potassium 4.2 11/07/2016 12:04 PM    Chloride 104 11/07/2016 12:04 PM    CO2 22 11/07/2016 12:04 PM    Glucose 85 11/07/2016 12:04 PM    BUN 28 11/07/2016 12:04 PM    Creatinine 0.86 11/07/2016 12:04 PM    BUN/Creatinine ratio 33 11/07/2016 12:04 PM    GFR est AA 71 11/07/2016 12:04 PM    GFR est non-AA 61 11/07/2016 12:04 PM    Calcium 9.2 11/07/2016 12:04 PM     CMP at [facility] showed GFRaa >60, Cr 1, Na+ a little low at 132. LFT's ok. Atrial fibrillation, intermittent. Current symptoms: none  Current control agent: B-blocker  Current anticoagulant: ASA 81mg every day and plavix 75mg every day. History of bleeding problems: GI bleed 9/2015, with drop in HCT. Lab Results   Component Value Date/Time    WBC 7.4 03/08/2016 11:31 AM    HGB 12.8 03/08/2016 11:31 AM    HCT 39.7 03/08/2016 11:31 AM    PLATELET 434 16/68/3152 11:31 AM    MCV 96 03/08/2016 11:31 AM     H/H at [facility] ER 1/2017 13.3/40    CHD  No further hx bleed since 9/2015. Remains on ASA 81 and plavix (con't by cardio), b-blocker, statin, and diuretic. Gina well. No myalgias, arthralgias, unusual weakness. Does have increased risk of recurrent GI bleed on double antiplt, and is on pepcid for GI prophy. No sx lately.   Lab Results   Component Value Date/Time    Cholesterol, total 122 03/08/2016 11:31 AM    HDL Cholesterol 65 03/08/2016 11:31 AM    LDL, calculated 45 03/08/2016 11:31 AM    VLDL, calculated 12 03/08/2016 11:31 AM    Triglyceride 59 03/08/2016 11:31 AM     Lab Results   Component Value Date/Time    ALT (SGPT) 21 03/08/2016 11:31 AM    AST (SGOT) 27 03/08/2016 11:31 AM    Alk. phosphatase 102 03/08/2016 11:31 AM    Bilirubin, total 0.4 03/08/2016 11:31 AM     LFT's normal on check at [address] [place] [address] 1/2017. PMH, SH, Medications/Allergies: reviewed, on chart. ROS:  Constitutional: No fever, chills or weight loss  Respiratory: No cough, SOB   CV: No chest pain, rare palpitations, no change. Visit Vitals    /69    Pulse 77    Temp 98.7 °F (37.1 °C) (Oral)    Resp 20    Ht 4' 6\" (1.372 m)    Wt 193 lb 3 oz (87.6 kg)    SpO2 96%    BMI 46.58 kg/m2     Wt Readings from Last 3 Encounters:   04/28/17 193 lb 3 oz (87.6 kg)   03/27/17 194 lb (88 kg)   01/25/17 195 lb (88.5 kg)     BP Readings from Last 3 Encounters:   04/28/17 142/69   03/27/17 153/73   01/25/17 164/72     Physical Examination: General appearance - alert, well appearing, and in no distress  Mental status - alert, oriented to person, place, and time  Eyes - pupils equal and reactive, extraocular eye movements intact  ENT - bilateral external ears and nose normal. Normal lips  Neck - supple, no significant adenopathy, no thyromegaly or mass  Lymphatics - no palpable lymphadenopathy, no hepatosplenomegaly  Chest - clear to auscultation, no wheezes, rales or rhonchi, symmetric air entry  Heart - normal rate, regular rhythm, normal S1, S2, no murmurs, rubs, clicks or gallops  Extremities - peripheral pulses normal, trace pedal edema, no clubbing or cyanosis    A/P:  HTN  Finally in goal. Con't  Current regimen. Check BMP. Use support stockings to help with edema. Consider boost aldactone to 25mg every day if edema worsening/not improving. CHD and HLD  Stable since last visit, no anemia. gina meds well. Work on RF reduction. Check lipids. Afib with hx GI bleed hx, resolved. Blood count good at last check. Has been 2yr since last stent placed.  Con't pepcid for GI prophy. Monitor. DJD  Doing well on T3's. Con't. Has plenty. F/U 3mo.

## 2017-05-05 LAB
BUN SERPL-MCNC: 31 MG/DL (ref 8–27)
BUN/CREAT SERPL: 28 (ref 12–28)
CALCIUM SERPL-MCNC: 9.4 MG/DL (ref 8.7–10.3)
CHLORIDE SERPL-SCNC: 106 MMOL/L (ref 96–106)
CHOLEST SERPL-MCNC: 125 MG/DL (ref 100–199)
CO2 SERPL-SCNC: 24 MMOL/L (ref 18–29)
CREAT SERPL-MCNC: 1.09 MG/DL (ref 0.57–1)
GLUCOSE SERPL-MCNC: 124 MG/DL (ref 65–99)
HDLC SERPL-MCNC: 62 MG/DL
LDLC SERPL CALC-MCNC: 49 MG/DL (ref 0–99)
POTASSIUM SERPL-SCNC: 4.1 MMOL/L (ref 3.5–5.2)
SODIUM SERPL-SCNC: 144 MMOL/L (ref 134–144)
TRIGL SERPL-MCNC: 69 MG/DL (ref 0–149)
VLDLC SERPL CALC-MCNC: 14 MG/DL (ref 5–40)

## 2017-06-01 ENCOUNTER — TELEPHONE (OUTPATIENT)
Dept: FAMILY MEDICINE CLINIC | Age: 82
End: 2017-06-01

## 2017-06-02 ENCOUNTER — OFFICE VISIT (OUTPATIENT)
Dept: FAMILY MEDICINE CLINIC | Age: 82
End: 2017-06-02

## 2017-06-02 ENCOUNTER — TELEPHONE (OUTPATIENT)
Dept: FAMILY MEDICINE CLINIC | Age: 82
End: 2017-06-02

## 2017-06-02 VITALS
HEART RATE: 88 BPM | DIASTOLIC BLOOD PRESSURE: 68 MMHG | WEIGHT: 195 LBS | OXYGEN SATURATION: 96 % | HEIGHT: 55 IN | BODY MASS INDEX: 45.13 KG/M2 | TEMPERATURE: 97.4 F | RESPIRATION RATE: 16 BRPM | SYSTOLIC BLOOD PRESSURE: 169 MMHG

## 2017-06-02 DIAGNOSIS — I10 ESSENTIAL HYPERTENSION: ICD-10-CM

## 2017-06-02 DIAGNOSIS — I25.10 CORONARY ARTERY DISEASE INVOLVING NATIVE CORONARY ARTERY OF NATIVE HEART WITHOUT ANGINA PECTORIS: ICD-10-CM

## 2017-06-02 RX ORDER — SPIRONOLACTONE 25 MG/1
25 TABLET ORAL DAILY
Qty: 30 TAB | Refills: 11 | Status: SHIPPED | OUTPATIENT
Start: 2017-06-02 | End: 2018-01-19 | Stop reason: SDUPTHER

## 2017-06-02 RX ORDER — LISINOPRIL 20 MG/1
TABLET ORAL
Refills: 0 | COMMUNITY
Start: 2017-05-05 | End: 2017-08-04 | Stop reason: DRUGHIGH

## 2017-06-02 RX ORDER — AMLODIPINE BESYLATE 2.5 MG/1
TABLET ORAL
Refills: 1 | COMMUNITY
Start: 2017-05-05 | End: 2017-10-05 | Stop reason: SDUPTHER

## 2017-06-02 NOTE — PROGRESS NOTES
Lorenzo Basurto is a 80 y.o. female presenting for/with: Foot Swelling (both)    HPI:  Hypertension. Blood pressures have been up a bit. Management at last visit included con't current tx. Taking regularly, gina well. Current regimen: ACE inhibitor, beta-blocker, CCB, aldactone, and thiazide diuretic. Symptoms include mild edema lately bilat to ankles and lower shins and sore medial knees bilat. Has been doing more stair climbing lately. Patient denies chest pain, palpitations, orthopnea. Lab review:   Lab Results   Component Value Date/Time    Sodium 144 05/04/2017 10:16 AM    Potassium 4.1 05/04/2017 10:16 AM    Chloride 106 05/04/2017 10:16 AM    CO2 24 05/04/2017 10:16 AM    Glucose 124 05/04/2017 10:16 AM    BUN 31 05/04/2017 10:16 AM    Creatinine 1.09 05/04/2017 10:16 AM    BUN/Creatinine ratio 28 05/04/2017 10:16 AM    GFR est AA 53 05/04/2017 10:16 AM    GFR est non-AA 46 05/04/2017 10:16 AM    Calcium 9.4 05/04/2017 10:16 AM     CMP at [facility] showed GFRaa >60, Cr 1, Na+ a little low at 132. LFT's ok. Atrial fibrillation, intermittent. Current symptoms: occ fluttering feelings  Current control agent: B-blocker (max dose coreg)  Current anticoagulant: ASA 81mg every day and plavix 75mg every day. History of bleeding problems: GI bleed 9/2015, with drop in HCT. H/H at [facility] ER 1/2017 13.3/40    CHD  No further hx bleed since 9/2015. Remains on ASA 81 and plavix (con't by cardio), b-blocker, statin, and diuretic. Gina well. No myalgias, arthralgias, unusual weakness. Does have increased risk of recurrent GI bleed on double antiplt, and is on pepcid for GI prophy. No sx lately.   Lab Results   Component Value Date/Time    Cholesterol, total 125 05/04/2017 10:16 AM    HDL Cholesterol 62 05/04/2017 10:16 AM    LDL, calculated 49 05/04/2017 10:16 AM    VLDL, calculated 14 05/04/2017 10:16 AM    Triglyceride 69 05/04/2017 10:16 AM     Lab Results   Component Value Date/Time    ALT (SGPT) 21 03/08/2016 11:31 AM    AST (SGOT) 27 03/08/2016 11:31 AM    Alk. phosphatase 102 03/08/2016 11:31 AM    Bilirubin, total 0.4 03/08/2016 11:31 AM     LFT's normal on check at [address] [place], [place] [address]y 1/2017. PMH, SH, Medications/Allergies: reviewed, on chart. ROS:  Constitutional: No fever, chills or weight loss  Respiratory: No cough, SOB   CV: No chest pain, rare palpitations, no change. Visit Vitals    /68 (BP 1 Location: Right arm, BP Patient Position: Sitting)    Pulse 88    Temp 97.4 °F (36.3 °C) (Oral)    Resp 16    Ht 4' 6\" (1.372 m)    Wt 195 lb (88.5 kg)    SpO2 96%    BMI 47.02 kg/m2     Wt Readings from Last 3 Encounters:   06/02/17 195 lb (88.5 kg)   04/28/17 193 lb 3 oz (87.6 kg)   03/27/17 194 lb (88 kg)     BP Readings from Last 3 Encounters:   06/02/17 169/68   04/28/17 142/69   03/27/17 153/73     Physical Examination: General appearance - alert, well appearing, and in no distress  Mental status - alert, oriented to person, place, and time  Eyes - pupils equal and reactive, extraocular eye movements intact  ENT - bilateral external ears and nose normal. Normal lips  Neck - supple, no significant adenopathy, no thyromegaly or mass  Lymphatics - no palpable lymphadenopathy, no hepatosplenomegaly  Chest - clear to auscultation, no wheezes, rales or rhonchi, symmetric air entry  Heart - normal rate, regular rhythm, normal S1, S2, no murmurs, rubs, clicks or gallops  Extremities - peripheral pulses normal, 1+ pedal edema, no clubbing or cyanosis, with bilat knee edema and medial ttp. No redness or warmth to LE's. A/P:  HTN  Above goal again. Worsening edema. Boost aldactone to 25mg every day if edema worsening/not improving. CHD and HLD  Stable since last visit, no anemia. gina meds well. Work on RF reduction. Check lipids. Afib with hx GI bleed hx, resolved. Blood count good at last check, some sx.  Consider change norvasc 5mg to verapamil (would need to halve lipitor due to drug int though) if palpitations become more bothersome. Con't pepcid for GI prophy. Monitor. DJD  Doing well on T3's. Con't. Has plenty. F/U 3mo.

## 2017-06-02 NOTE — PATIENT INSTRUCTIONS
Boost the spironolactone to whole pill daily to help pressure and fluid. Get your knee high stockings and wear them.

## 2017-06-02 NOTE — MR AVS SNAPSHOT
Visit Information Date & Time Provider Department Dept. Phone Encounter #  
 6/2/2017  9:50 AM Ruben Magana MD [address] [place] [address] [id number] Follow-up Instructions Return in about 2 months (around 8/2/2017). Follow-up and Disposition History Your Appointments 8/4/2017 10:30 AM  
[facility] Appt Note: 2  month f/u; fu  
 [address] [facility] [address] [facility],[address] [address] [phone]  
  
   
 [address] [facility] [address] [facility] [address] Upcoming Health Maintenance Date Due DTaP/Tdap/Td series (1 - Tdap) 3/5/1951 ZOSTER VACCINE AGE 60> 3/5/1990 GLAUCOMA SCREENING Q2Y 3/5/1995 OSTEOPOROSIS SCREENING (DEXA) 3/5/1995 Pneumococcal 65+ Low/Medium Risk (2 of 2 - PPSV23) 12/21/2016 INFLUENZA AGE 9 TO ADULT 8/1/2017 MEDICARE YEARLY EXAM 1/26/2018 Allergies as of 6/2/2017  Review Complete On: 6/2/2017 By: Ruben Magana MD  
 No Known Allergies Current Immunizations  Reviewed on 3/27/2017 Name Date Influenza High Dose Vaccine PF 11/7/2016 Influenza Vaccine 10/13/2015, 10/6/2015 Pneumococcal Conjugate (PCV-13) 12/21/2015 Not reviewed this visit You Were Diagnosed With   
  
 Codes Comments Coronary artery disease involving native coronary artery of native heart without angina pectoris     ICD-10-CM: I25.10 ICD-9-CM: 414.01 Essential hypertension     ICD-10-CM: I10 
ICD-9-CM: 401.9 Vitals BP Pulse Temp Resp Height(growth percentile) Weight(growth percentile) 169/68 (BP 1 Location: Right arm, BP Patient Position: Sitting) 88 97.4 °F (36.3 °C) (Oral) 16 4' 6\" (1.372 m) 195 lb (88.5 kg) SpO2 BMI OB Status Smoking Status 96% 47.02 kg/m2 Hysterectomy Former Smoker BMI and BSA Data Body Mass Index Body Surface Area 47.02 kg/m 2 1.84 m 2 Preferred Pharmacy Pharmacy Name Phone [facility] [address] [phone] Your Updated Medication List  
  
   
This list is accurate as of: 6/2/17 11:17 AM.  Always use your most recent med list.  
  
  
  
  
 acetaminophen-codeine 300-30 mg per tablet Commonly known as:  TYLENOL #3 Take 1-2 Tabs by mouth two (2) times daily as needed for Pain. Max Daily Amount: 4 Tabs. Indications: arthritis * amLODIPine 5 mg tablet Commonly known as:  Barabara Puls Take 1 Tab by mouth daily. Indications: pressure and heart * amLODIPine 2.5 mg tablet Commonly known as:  NORVASC  
  
 aspirin delayed-release 81 mg tablet Take  by mouth daily. atorvastatin 40 mg tablet Commonly known as:  LIPITOR Take 1 Tab by mouth daily. Indications: cholesterol and heart  
  
 carvedilol 25 mg tablet Commonly known as:  COREG  
take 1 tablet by mouth twice a day with meals  
  
 clopidogrel 75 mg Tab Commonly known as:  PLAVIX Take 75 mg by mouth daily. famotidine 20 mg tablet Commonly known as:  PEPCID Take 1 Tab by mouth two (2) times a day. lisinopril 20 mg tablet Commonly known as:  PRINIVIL, ZESTRIL  
  
 lisinopril-hydroCHLOROthiazide 20-12.5 mg per tablet Commonly known as:  Jessica Nation Take 2 Tabs by mouth daily. Indications: heart and pressure, replaces plain thiazide and plain lisinopril  
  
 nitroglycerin 0.4 mg SL tablet Commonly known as:  NITROSTAT  
by SubLINGual route every five (5) minutes as needed for Chest Pain. spironolactone 25 mg tablet Commonly known as:  ALDACTONE Take 1 Tab by mouth daily. Indications: pressure and heart * Notice: This list has 2 medication(s) that are the same as other medications prescribed for you. Read the directions carefully, and ask your doctor or other care provider to review them with you. Prescriptions Sent to Pharmacy Refills spironolactone (ALDACTONE) 25 mg tablet 11 Sig: Take 1 Tab by mouth daily. Indications: pressure and heart Class: Normal  
 Pharmacy: [facility] [phone]  #: [phone] Route: Oral  
  
Follow-up Instructions Return in about 2 months (around 8/2/2017). Patient Instructions Boost the spironolactone to whole pill daily to help pressure and fluid. Get your knee high stockings and wear them. Introducing \[facility]\"" & HEALTH SERVICES! [facility] introduces Penxy patient portal. Now you can access parts of your medical record, email your doctor's office, and request medication refills online. 1. In your internet browser, go to [email or web address] 2. Click on the First Time User? Click Here link in the Sign In box. You will see the New Member Sign Up page. 3. Enter your Penxy Access Code exactly as it appears below. You will not need to use this code after youve completed the sign-up process. If you do not sign up before the expiration date, you must request a new code. · Penxy Access Code: [email or web address] Expires: 7/27/2017 12:04 PM 
 
4. Enter the last four digits of your Social Security Number (xxxx) and Date of Birth (mm/dd/yyyy) as indicated and click Submit. You will be taken to the next sign-up page. 5. Create a Penxy ID. This will be your Penxy login ID and cannot be changed, so think of one that is secure and easy to remember. 6. Create a Penxy password. You can change your password at any time. 7. Enter your Password Reset Question and Answer. This can be used at a later time if you forget your password. 8. Enter your e-mail address. You will receive e-mail notification when new information is available in [address] 9. Click Sign Up. You can now view and download portions of your medical record. 10. Click the Download Summary menu link to download a portable copy of your medical information. If you have questions, please visit the Frequently Asked Questions section of the [email or web address] website. Remember, [employer] is NOT to be used for urgent needs. For medical emergencies, dial 911. Now available from your iPhone and Android! Please provide this summary of care documentation to your next provider. Your primary care clinician is listed as Neris Strickland. If you have any questions after today's visit, please call [phone].

## 2017-08-04 ENCOUNTER — OFFICE VISIT (OUTPATIENT)
Dept: FAMILY MEDICINE CLINIC | Age: 82
End: 2017-08-04

## 2017-08-04 VITALS
SYSTOLIC BLOOD PRESSURE: 172 MMHG | DIASTOLIC BLOOD PRESSURE: 88 MMHG | HEART RATE: 86 BPM | TEMPERATURE: 98.1 F | OXYGEN SATURATION: 95 % | BODY MASS INDEX: 46.49 KG/M2 | WEIGHT: 192.8 LBS | RESPIRATION RATE: 17 BRPM

## 2017-08-04 DIAGNOSIS — M25.562 CHRONIC PAIN OF LEFT KNEE: Primary | ICD-10-CM

## 2017-08-04 DIAGNOSIS — I25.10 CORONARY ARTERY DISEASE INVOLVING NATIVE CORONARY ARTERY OF NATIVE HEART WITHOUT ANGINA PECTORIS: ICD-10-CM

## 2017-08-04 DIAGNOSIS — M19.90 ARTHRITIS: ICD-10-CM

## 2017-08-04 DIAGNOSIS — G89.29 CHRONIC PAIN OF LEFT KNEE: Primary | ICD-10-CM

## 2017-08-04 RX ORDER — LISINOPRIL AND HYDROCHLOROTHIAZIDE 12.5; 2 MG/1; MG/1
2 TABLET ORAL DAILY
Qty: 180 TAB | Refills: 3 | Status: SHIPPED | OUTPATIENT
Start: 2017-08-04 | End: 2018-04-10 | Stop reason: SDUPTHER

## 2017-08-04 RX ORDER — TRIAMCINOLONE ACETONIDE 40 MG/ML
40 INJECTION, SUSPENSION INTRA-ARTICULAR; INTRAMUSCULAR ONCE
Qty: 1 ML | Refills: 0
Start: 2017-08-04 | End: 2017-08-04

## 2017-08-04 NOTE — MR AVS SNAPSHOT
Visit Information Date & Time Provider Department Dept. Phone Encounter #  
 8/4/2017 10:30 AM Shala Gonzalez MD [address] [place] [address] [id number] Upcoming Health Maintenance Date Due ZOSTER VACCINE AGE 60> 1/5/1990 GLAUCOMA SCREENING Q2Y 3/5/1995 OSTEOPOROSIS SCREENING (DEXA) 3/5/1995 Pneumococcal 65+ Low/Medium Risk (2 of 2 - PPSV23) 12/21/2016 INFLUENZA AGE 9 TO ADULT 12/7/2017* MEDICARE YEARLY EXAM 1/26/2018 DTaP/Tdap/Td series (2 - Td) 8/4/2027 *Topic was postponed. The date shown is not the original due date. Allergies as of 8/4/2017  Review Complete On: 8/4/2017 By: Flaco Vasques No Known Allergies Current Immunizations  Reviewed on 8/4/2017 Name Date Influenza High Dose Vaccine PF 11/7/2016 Influenza Vaccine 10/13/2015, 10/6/2015 Pneumococcal Conjugate (PCV-13) 12/21/2015 Reviewed by Flaco Vasques on 8/4/2017 at 10:34 AM  
 Reviewed by Flaco Vasques on 8/4/2017 at 10:35 AM  
You Were Diagnosed With   
  
 Codes Comments Chronic pain of left knee    -  Primary ICD-10-CM: M25.562, V20.73 ICD-9-CM: 719.46, 338.29 Coronary artery disease involving native coronary artery of native heart without angina pectoris     ICD-10-CM: I25.10 ICD-9-CM: 414.01 Arthritis     ICD-10-CM: M19.90 ICD-9-CM: 716.90 Vitals BP Pulse Temp Resp Weight(growth percentile) SpO2  
 172/88 (BP 1 Location: Left arm, BP Patient Position: Sitting) 86 98.1 °F (36.7 °C) (Oral) 17 192 lb 12.8 oz (87.5 kg) 95% BMI OB Status Smoking Status 46.49 kg/m2 Hysterectomy Former Smoker Vitals History BMI and BSA Data Body Mass Index Body Surface Area  
 46.49 kg/m 2 1.83 m 2 Preferred Pharmacy Pharmacy Name Phone [facility] [phone] [place] [address] [phone] Your Updated Medication List  
  
   
 This list is accurate as of: 8/4/17 11:18 AM.  Always use your most recent med list.  
  
  
  
  
 acetaminophen-codeine 300-30 mg per tablet Commonly known as:  TYLENOL #3 Take 1-2 Tabs by mouth two (2) times daily as needed for Pain. Max Daily Amount: 4 Tabs. Indications: arthritis  
  
 amLODIPine 2.5 mg tablet Commonly known as:  NORVASC  
  
 aspirin delayed-release 81 mg tablet Take  by mouth daily. atorvastatin 40 mg tablet Commonly known as:  LIPITOR Take 1 Tab by mouth daily. Indications: cholesterol and heart  
  
 carvedilol 25 mg tablet Commonly known as:  COREG  
take 1 tablet by mouth twice a day with meals  
  
 clopidogrel 75 mg Tab Commonly known as:  PLAVIX Take 75 mg by mouth daily. famotidine 20 mg tablet Commonly known as:  PEPCID Take 1 Tab by mouth two (2) times a day. lisinopril-hydroCHLOROthiazide 20-12.5 mg per tablet Commonly known as:  Cole Meres Take 2 Tabs by mouth daily. Indications: heart and pressure, replaces plain lisinopril  
  
 nitroglycerin 0.4 mg SL tablet Commonly known as:  NITROSTAT  
by SubLINGual route every five (5) minutes as needed for Chest Pain. spironolactone 25 mg tablet Commonly known as:  ALDACTONE Take 1 Tab by mouth daily. Indications: pressure and heart  
  
 triamcinolone acetonide 40 mg/mL injection Commonly known as:  KENALOG  
1 mL by IntraMUSCular route once for 1 dose. Prescriptions Sent to Pharmacy Refills  
 lisinopril-hydroCHLOROthiazide (PRINZIDE, ZESTORETIC) 20-12.5 mg per tablet 3 Sig: Take 2 Tabs by mouth daily. Indications: heart and pressure, replaces plain lisinopril Class: Normal  
 Pharmacy: [facility] [id number] [address] Ph #: [phone] Route: Oral  
  
We Performed the Following ARTHROCENTESIS ASPIR&/INJ MAJOR JT/SHANTANU W/US [51606 CPT(R)] TRIAMCINOLONE ACETONIDE INJ [ [facility]] Patient Instructions If you have any questions regarding [employer], you may call [employer] support at [phone]. Introducing [facility] & [facility] SERVICES! Naomi Rooney introduces Do It In Person patient portal. Now you can access parts of your medical record, email your doctor's office, and request medication refills online. 1. In your internet browser, go to [email or web address] 2. Click on the First Time User? Click Here link in the Sign In box. You will see the New Member Sign Up page. 3. Enter your [employer] It [employer] Person Access Code exactly as it appears below. You will not need to use this code after youve completed the sign-up process. If you do not sign up before the expiration date, you must request a new code. · [employer] Access Code: [email or web address] Expires: 11/2/2017 11:18 AM 
 
4. Enter the last four digits of your Social Security Number (xxxx) and Date of Birth (mm/dd/yyyy) as indicated and click Submit. You will be taken to the next sign-up page. 5. Create a [employer] It In Person ID. This will be your [employer] login ID and cannot be changed, so think of one that is secure and easy to remember. 6. Create a [employer] Person password. You can change your password at any time. 7. Enter your Password Reset Question and Answer. This can be used at a later time if you forget your password. 8. Enter your e-mail address. You will receive e-mail notification when new information is available in [address] 9. Click Sign Up. You can now view and download portions of your medical record. 10. Click the Download Summary menu link to download a portable copy of your medical information. If you have questions, please visit the Frequently Asked Questions section of the [employer] It [employer] Person website. Remember, [employer] is NOT to be used for urgent needs. For medical emergencies, dial 911. Now available from your iPhone and Android! Please provide this summary of care documentation to your next provider. Your primary care clinician is listed as Mily Strickland. If you have any questions after today's visit, please call [phone].

## 2017-08-04 NOTE — PATIENT INSTRUCTIONS
If you have any questions regarding [employer], you may call [employer] support at [phone].

## 2017-08-04 NOTE — PROGRESS NOTES
Grabiel Holloway is a 80 y.o. female presenting for/with:    Knee Pain    HPI:  Knee pain  Pt reports gradually worsening pain to the L anterior knee. No trauma or overuse. We've tried adding T#3, but not really helping. Interested in shot today. Hypertension. Blood pressures have con't to be high. Management at last visit included boosting aldactone to 25mg every day. Taking regularly, gina well. Current regimen: ACE inhibitor, beta-blocker, CCB, aldactone, and thiazide diuretic. Symptoms include mild edema lately bilat to ankles and lower shins and sore medial knees bilat. Has been doing more stair climbing lately. Patient denies chest pain, palpitations, orthopnea. Lab review:   Lab Results   Component Value Date/Time    Sodium 144 05/04/2017 10:16 AM    Potassium 4.1 05/04/2017 10:16 AM    Chloride 106 05/04/2017 10:16 AM    CO2 24 05/04/2017 10:16 AM    Glucose 124 05/04/2017 10:16 AM    BUN 31 05/04/2017 10:16 AM    Creatinine 1.09 05/04/2017 10:16 AM    BUN/Creatinine ratio 28 05/04/2017 10:16 AM    GFR est AA 53 05/04/2017 10:16 AM    GFR est non-AA 46 05/04/2017 10:16 AM    Calcium 9.4 05/04/2017 10:16 AM     CMP at [facility] showed GFRaa >60, Cr 1, Na+ a little low at 132. LFT's ok. Atrial fibrillation, intermittent. Current symptoms: occ fluttering feelings  Current control agent: B-blocker (max dose coreg)  Current anticoagulant: ASA 81mg every day and plavix 75mg every day. History of bleeding problems: GI bleed 9/2015, with drop in HCT. H/H at [facility] ER 1/2017 13.3/40    CHD  No further hx bleed since 9/2015. Remains on ASA 81 and plavix (con't by cardio), b-blocker, statin, and diuretic. Gina well. No myalgias, arthralgias, unusual weakness. Does have increased risk of recurrent GI bleed on double antiplt, and is on pepcid for GI prophy. No sx lately.   Lab Results   Component Value Date/Time    Cholesterol, total 125 05/04/2017 10:16 AM    HDL Cholesterol 62 05/04/2017 10:16 AM    LDL, calculated 49 05/04/2017 10:16 AM    VLDL, calculated 14 05/04/2017 10:16 AM    Triglyceride 69 05/04/2017 10:16 AM     Lab Results   Component Value Date/Time    ALT (SGPT) 21 03/08/2016 11:31 AM    AST (SGOT) 27 03/08/2016 11:31 AM    Alk. phosphatase 102 03/08/2016 11:31 AM    Bilirubin, total 0.4 03/08/2016 11:31 AM     LFT's normal on check at [address] [place] [address] 1/2017. Hypertension. Blood pressures have been worsening. Management at last visit included boost aldactone to whole 25mg pill daily and con't other pills, but pt's cardio seemed to have ordered an older Rx of plain lisinopril 20mg every day instead of the current dose of 20/12.5 zestoretic 2/d. Current regimen: ACE inhibitor, beta-blocker, calcium channel blocker and aldactone. Symptoms include no symptoms. Patient denies chest pain, palpitations. Lab review:   Lab Results   Component Value Date/Time    Sodium 144 05/04/2017 10:16 AM    Potassium 4.1 05/04/2017 10:16 AM    Chloride 106 05/04/2017 10:16 AM    CO2 24 05/04/2017 10:16 AM    Glucose 124 05/04/2017 10:16 AM    BUN 31 05/04/2017 10:16 AM    Creatinine 1.09 05/04/2017 10:16 AM    BUN/Creatinine ratio 28 05/04/2017 10:16 AM    GFR est AA 53 05/04/2017 10:16 AM    GFR est non-AA 46 05/04/2017 10:16 AM    Calcium 9.4 05/04/2017 10:16 AM     PMH, SH, Medications/Allergies: reviewed, on chart. ROS:  Constitutional: No fever, chills or weight loss  Respiratory: No cough, SOB   CV: No chest pain, rare palpitations, no change.     Visit Vitals    /88 (BP 1 Location: Left arm, BP Patient Position: Sitting)    Pulse 86    Temp 98.1 °F (36.7 °C) (Oral)    Resp 17    Wt 192 lb 12.8 oz (87.5 kg)    SpO2 95%    BMI 46.49 kg/m2     Wt Readings from Last 3 Encounters:   08/04/17 192 lb 12.8 oz (87.5 kg)   06/02/17 195 lb (88.5 kg)   04/28/17 193 lb 3 oz (87.6 kg)     BP Readings from Last 3 Encounters:   08/04/17 172/88   06/02/17 169/68   04/28/17 142/69     Physical Examination: General appearance - alert, well appearing, and in no distress  Mental status - alert, oriented to person, place, and time  Eyes - pupils equal and reactive, extraocular eye movements intact  ENT - bilateral external ears and nose normal. Normal lips  Neck - supple, no significant adenopathy, no thyromegaly or mass  Lymphatics - no palpable lymphadenopathy, no hepatosplenomegaly  Chest - clear to auscultation, no wheezes, rales or rhonchi, symmetric air entry  Heart - normal rate, regular rhythm, normal S1, S2, no murmurs, rubs, clicks or gallops  Extremities - peripheral pulses normal, 1+ pedal edema, no clubbing or cyanosis, with bilat knee edema and medial ttp. No redness or warmth to LE's. A/P:  DJD knee L  No help from T3. Discussed options. Pt interested in arthrocentesis. Inj today L knee. HTN  Above goal again. R/s zestoretic 20/12.5 2/d. CHD and HLD  Stable since last visit, no anemia. gina meds well. Work on RF reduction. Afib with hx GI bleed hx, resolved. Blood count good at last check, some sx. Consider change norvasc 5mg to verapamil (would need to halve lipitor due to drug int though) if palpitations become more bothersome. Con't pepcid for GI prophy. Monitor. F/U 3mo. Chart reviewed for the following:   Samantha Thurston MD, have reviewed the History, Physical and updated the Allergic reactions for Lianet Jeffrey performed immediately prior to start of procedure:   Samantha Thurston MD, have performed the following reviews on Rachel Petties prior to the start of the procedure:            * Patient was identified by name and date of birth   * Agreement on procedure being performed was verified  * Risks and Benefits explained to the patient  * Procedure site verified and marked as necessary  * Patient was positioned for comfort  * Consent was verified    Procedure: Inject L knee joint. Indication: intractable arthritis pain. Consent: given.  Details: Sterile techinque. Sterile prep. Anterolateral approach. the 1.5\" 21ga needle easily placed into the joint space and 40mg kenalog inj mixed with 8ml of 2% plain lidocaine easily injected. Pt gina well. Complications: none. Disposition: A+O, ambulatory without difficulty. Steroid flare side effects reviewed with alberto. Pt will obs. for s/sx of infection.

## 2017-10-05 ENCOUNTER — OFFICE VISIT (OUTPATIENT)
Dept: FAMILY MEDICINE CLINIC | Age: 82
End: 2017-10-05

## 2017-10-05 VITALS
DIASTOLIC BLOOD PRESSURE: 56 MMHG | HEART RATE: 82 BPM | BODY MASS INDEX: 46.92 KG/M2 | SYSTOLIC BLOOD PRESSURE: 120 MMHG | RESPIRATION RATE: 16 BRPM | OXYGEN SATURATION: 96 % | WEIGHT: 194.6 LBS

## 2017-10-05 DIAGNOSIS — Z51.81 THERAPEUTIC DRUG MONITORING: ICD-10-CM

## 2017-10-05 DIAGNOSIS — G89.29 CHRONIC PAIN OF LEFT KNEE: ICD-10-CM

## 2017-10-05 DIAGNOSIS — E78.00 PURE HYPERCHOLESTEROLEMIA: ICD-10-CM

## 2017-10-05 DIAGNOSIS — M25.562 CHRONIC PAIN OF LEFT KNEE: ICD-10-CM

## 2017-10-05 DIAGNOSIS — I48.0 PAROXYSMAL ATRIAL FIBRILLATION (HCC): ICD-10-CM

## 2017-10-05 DIAGNOSIS — I10 ESSENTIAL HYPERTENSION: ICD-10-CM

## 2017-10-05 DIAGNOSIS — I25.10 CORONARY ARTERY DISEASE INVOLVING NATIVE CORONARY ARTERY OF NATIVE HEART WITHOUT ANGINA PECTORIS: Primary | ICD-10-CM

## 2017-10-05 DIAGNOSIS — Z79.01 ANTICOAGULANT LONG-TERM USE: ICD-10-CM

## 2017-10-05 DIAGNOSIS — Z23 ENCOUNTER FOR IMMUNIZATION: ICD-10-CM

## 2017-10-05 DIAGNOSIS — M19.90 ARTHRITIS: ICD-10-CM

## 2017-10-05 RX ORDER — HYDROCODONE BITARTRATE AND ACETAMINOPHEN 5; 325 MG/1; MG/1
1 TABLET ORAL
Qty: 60 TAB | Refills: 0 | Status: SHIPPED | OUTPATIENT
Start: 2017-10-05 | End: 2018-02-02 | Stop reason: SDUPTHER

## 2017-10-05 RX ORDER — HYDROCODONE BITARTRATE AND ACETAMINOPHEN 5; 325 MG/1; MG/1
1 TABLET ORAL
Qty: 60 TAB | Refills: 0 | Status: SHIPPED | OUTPATIENT
Start: 2017-12-04 | End: 2018-04-10 | Stop reason: SDUPTHER

## 2017-10-05 RX ORDER — AMLODIPINE BESYLATE 2.5 MG/1
2.5 TABLET ORAL DAILY
Qty: 30 TAB | Refills: 11 | Status: SHIPPED | OUTPATIENT
Start: 2017-10-05 | End: 2018-09-12 | Stop reason: SDUPTHER

## 2017-10-05 RX ORDER — HYDROCODONE BITARTRATE AND ACETAMINOPHEN 5; 325 MG/1; MG/1
1 TABLET ORAL
Qty: 60 TAB | Refills: 0 | Status: SHIPPED | OUTPATIENT
Start: 2017-11-04 | End: 2018-02-02 | Stop reason: SDUPTHER

## 2017-10-05 NOTE — MR AVS SNAPSHOT
Visit Information Date & Time Provider Department Dept. Phone Encounter #  
 10/5/2017 11:10 AM Glendy Leon MD [address] [place] [address] [id number] Follow-up Instructions Return in about 3 months (around 1/5/2018). Follow-up and Disposition History Upcoming Health Maintenance Date Due  
 GLAUCOMA SCREENING Q2Y 3/5/1995 Pneumococcal 65+ Low/Medium Risk (2 of 2 - PPSV23) 12/21/2016 INFLUENZA AGE 9 TO ADULT 12/7/2017* MEDICARE YEARLY EXAM 1/26/2018 DTaP/Tdap/Td series (2 - Td) 8/4/2027 *Topic was postponed. The date shown is not the original due date. Allergies as of 10/5/2017  Review Complete On: 10/5/2017 By: Glendy Leon MD  
 No Known Allergies Current Immunizations  Reviewed on 8/4/2017 Name Date Influenza High Dose Vaccine PF 10/5/2017 11:30 AM, 11/7/2016 Influenza Vaccine 10/13/2015, 10/6/2015 Pneumococcal Conjugate (PCV-13) 12/21/2015 Pneumococcal Polysaccharide (PPSV-23) 10/26/1994 Not reviewed this visit You Were Diagnosed With   
  
 Codes Comments Coronary artery disease involving native coronary artery of native heart without angina pectoris    -  Primary ICD-10-CM: I25.10 ICD-9-CM: 414.01 Encounter for immunization     ICD-10-CM: W19 ICD-9-CM: V03.89 Arthritis     ICD-10-CM: M19.90 ICD-9-CM: 716.90 Essential hypertension     ICD-10-CM: I10 
ICD-9-CM: 401.9 Chronic pain of left knee     ICD-10-CM: M25.562, G89.29 ICD-9-CM: 719.46, 338.29 Anticoagulant long-term use     ICD-10-CM: Z79.01 
ICD-9-CM: V58.61 Paroxysmal atrial fibrillation (HCC)     ICD-10-CM: I48.0 ICD-9-CM: 427.31 Pure hypercholesterolemia     ICD-10-CM: E78.00 ICD-9-CM: 272.0 Therapeutic drug monitoring     ICD-10-CM: Z51.81 
ICD-9-CM: V58.83 Vitals  BP Pulse Resp Weight(growth percentile) SpO2 BMI  
 120/56 (BP 1 Location: Left arm, BP Patient Position: Sitting) 82 92 915 lb 9.6 oz (88.3 kg) 96% 46.92 kg/m2 OB Status Smoking Status Hysterectomy Former Smoker Vitals History BMI and BSA Data Body Mass Index Body Surface Area  
 46.92 kg/m 2 1.83 m 2 Preferred Pharmacy Pharmacy Name Phone [facility] [phone] [phone] Your Updated Medication List  
  
   
This list is accurate as of: 10/5/17 11:59 AM.  Always use your most recent med list. amLODIPine 2.5 mg tablet Commonly known as:  Mao Pedro Pablo Take 1 Tab by mouth daily. Indications: pressure and heart  
  
 aspirin delayed-release 81 mg tablet Take  by mouth daily. atorvastatin 40 mg tablet Commonly known as:  LIPITOR Take 1 Tab by mouth daily. Indications: cholesterol and heart  
  
 carvedilol 25 mg tablet Commonly known as:  COREG  
take 1 tablet by mouth twice a day with meals  
  
 clopidogrel 75 mg Tab Commonly known as:  PLAVIX Take 75 mg by mouth daily. famotidine 20 mg tablet Commonly known as:  PEPCID Take 1 Tab by mouth two (2) times a day. * HYDROcodone-acetaminophen 5-325 mg per tablet Commonly known as:  Harshil Cuellar Take 1 Tab by mouth two (2) times daily as needed for Pain. Max Daily Amount: 2 Tabs. Indications: arthritis * HYDROcodone-acetaminophen 5-325 mg per tablet Commonly known as:  Harshil Cuellar Take 1 Tab by mouth two (2) times daily as needed for Pain. Max Daily Amount: 2 Tabs. Start taking on:  11/4/2017  
  
 * HYDROcodone-acetaminophen 5-325 mg per tablet Commonly known as:  Harshilletty Oneiltiarra Take 1 Tab by mouth two (2) times daily as needed for Pain. Max Daily Amount: 2 Tabs. Start taking on:  12/4/2017  
  
 lisinopril-hydroCHLOROthiazide 20-12.5 mg per tablet Commonly known as:  Krishna Luis Take 2 Tabs by mouth daily. Indications: heart and pressure, replaces plain lisinopril  
  
 nitroglycerin 0.4 mg SL tablet Commonly known as:  NITROSTAT by SubLINGual route every five (5) minutes as needed for Chest Pain. spironolactone 25 mg tablet Commonly known as:  ALDACTONE Take 1 Tab by mouth daily. Indications: pressure and heart * Notice: This list has 3 medication(s) that are the same as other medications prescribed for you. Read the directions carefully, and ask your doctor or other care provider to review them with you. Prescriptions Printed Refills HYDROcodone-acetaminophen (NORCO) 5-325 mg per tablet 0 Sig: Take 1 Tab by mouth two (2) times daily as needed for Pain. Max Daily Amount: 2 Tabs. Indications: arthritis Class: Print Route: Oral  
 HYDROcodone-acetaminophen (NORCO) 5-325 mg per tablet 0 Starting on: 11/4/2017 Sig: Take 1 Tab by mouth two (2) times daily as needed for Pain. Max Daily Amount: 2 Tabs. Class: Print Route: Oral  
 HYDROcodone-acetaminophen (NORCO) 5-325 mg per tablet 0 Starting on: 12/4/2017 Sig: Take 1 Tab by mouth two (2) times daily as needed for Pain. Max Daily Amount: 2 Tabs. Class: Print Route: Oral  
  
Prescriptions Sent to Pharmacy Refills  
 amLODIPine (NORVASC) 2.5 mg tablet 11 Sig: Take 1 Tab by mouth daily. Indications: pressure and heart Class: Normal  
 Pharmacy: [address] #: [phone] Route: Oral  
  
We Performed the Following 7-DRUG SCREEN BUND., UR [id number] Custom] ADMIN INFLUENZA VIRUS VAC [ HCPCS] CBC WITH AUTOMATED DIFF [75445 CPT(R)] INFLUENZA VIRUS VACCINE, HIGH DOSE SEASONAL, PRESERVATIVE FREE [07004 CPT(R)] METABOLIC PANEL, BASIC [45539 CPT(R)] Follow-up Instructions Return in about 3 months (around 1/5/2018). Patient Instructions If you have any questions regarding [place], you may call [employer] support at [phone]. Introducing [facility]! [employer] introduces [employer] [employer] patient portal. Now you can access parts of your medical record, email your doctor's office, and request medication refills online. 1. In your internet browser, go to [email or web address] 2. Click on the First Time User? Click Here link in the Sign In box. You will see the New Member Sign Up page. 3. Enter your [facility] Access Code exactly as it appears below. You will not need to use this code after youve completed the sign-up process. If you do not sign up before the expiration date, you must request a new code. · [facility] Access Code: [email or web address] Expires: 11/2/2017 11:18 AM 
 
4. Enter the last four digits of your Social Security Number (xxxx) and Date of Birth (mm/dd/yyyy) as indicated and click Submit. You will be taken to the next sign-up page. 5. Create a [facility] ID. This will be your [facility] login ID and cannot be changed, so think of one that is secure and easy to remember. 6. Create a [facility] password. You can change your password at any time. 7. Enter your Password Reset Question and Answer. This can be used at a later time if you forget your password. 8. Enter your e-mail address. You will receive e-mail notification when new information is available in [address] 9. Click Sign Up. You can now view and download portions of your medical record. 10. Click the Download Summary menu link to download a portable copy of your medical information. If you have questions, please visit the Frequently Asked Questions section of the [facility] website. Remember, [facility] is NOT to be used for urgent needs. For medical emergencies, dial 911. Now available from your iPhone and Android! Please provide this summary of care documentation to your next provider. Your primary care clinician is listed as Luz Strickland. If you have any questions after today's visit, please call [phone].

## 2017-10-05 NOTE — PROGRESS NOTES
Sammy Tamez is a 80 y.o. female presenting for/with:    Knee Pain    HPI:  Knee pain  Pt reports gradually worsening pain to the L anterior knee. No trauma or overuse. We've tried adding T#3, but not really helping. Interested in shot today. Hypertension. Blood pressures have con't to be high. Management at last visit included boosting aldactone to 25mg every day. Taking regularly, gina well. Current regimen: ACE inhibitor, beta-blocker, CCB, aldactone, and thiazide diuretic. Symptoms include mild edema lately bilat to ankles and lower shins and sore medial knees bilat. Has been doing more stair climbing lately. Patient denies chest pain, palpitations, orthopnea. Lab review:   Lab Results   Component Value Date/Time    Sodium 144 05/04/2017 10:16 AM    Potassium 4.1 05/04/2017 10:16 AM    Chloride 106 05/04/2017 10:16 AM    CO2 24 05/04/2017 10:16 AM    Glucose 124 05/04/2017 10:16 AM    BUN 31 05/04/2017 10:16 AM    Creatinine 1.09 05/04/2017 10:16 AM    BUN/Creatinine ratio 28 05/04/2017 10:16 AM    GFR est AA 53 05/04/2017 10:16 AM    GFR est non-AA 46 05/04/2017 10:16 AM    Calcium 9.4 05/04/2017 10:16 AM     CMP at [facility] showed GFRaa >60, Cr 1, Na+ a little low at 132. LFT's ok. Atrial fibrillation, intermittent. Current symptoms: occ fluttering feelings  Current control agent: B-blocker (max dose coreg)  Current anticoagulant: ASA 81mg every day and plavix 75mg every day. History of bleeding problems: GI bleed 9/2015, with drop in HCT. H/H at [facility] ER 1/2017 13.3/40  Lab Results   Component Value Date/Time    HGB (POC) 13.9 12/21/2015 03:01 PM    HGB 12.8 11/07/2016 12:04 PM     CHD  No further hx bleed since 9/2015. Remains on ASA 81 and plavix (con't by cardio), b-blocker, statin, and diuretic. Gina well. No myalgias, arthralgias, unusual weakness. Does have increased risk of recurrent GI bleed on double antiplt, and is on pepcid for GI prophy. No sx lately.   Lab Results   Component Value Date/Time    Cholesterol, total 125 05/04/2017 10:16 AM    HDL Cholesterol 62 05/04/2017 10:16 AM    LDL, calculated 49 05/04/2017 10:16 AM    VLDL, calculated 14 05/04/2017 10:16 AM    Triglyceride 69 05/04/2017 10:16 AM     Lab Results   Component Value Date/Time    ALT (SGPT) 21 03/08/2016 11:31 AM    AST (SGOT) 27 03/08/2016 11:31 AM    Alk. phosphatase 102 03/08/2016 11:31 AM    Bilirubin, total 0.4 03/08/2016 11:31 AM     LFT's normal on check at [address] [place] [address] 1/2017. Hypertension. Blood pressures have been improving. Management at last visit included r/s zestoretic and con't other pills. Current regimen: ACE inhibitor, beta-blocker, calcium channel blocker, thiazide, and aldactone. Symptoms include occ chest pressure L upper chest at rest, not assoc with dyspnea, nausea, sweats. Happens about once a month, happens mostly a half hour or so after meals. Burps and sx resolve. Patient denies palpitations. Lab review:   Lab Results   Component Value Date/Time    Sodium 144 05/04/2017 10:16 AM    Potassium 4.1 05/04/2017 10:16 AM    Chloride 106 05/04/2017 10:16 AM    CO2 24 05/04/2017 10:16 AM    Glucose 124 05/04/2017 10:16 AM    BUN 31 05/04/2017 10:16 AM    Creatinine 1.09 05/04/2017 10:16 AM    BUN/Creatinine ratio 28 05/04/2017 10:16 AM    GFR est AA 53 05/04/2017 10:16 AM    GFR est non-AA 46 05/04/2017 10:16 AM    Calcium 9.4 05/04/2017 10:16 AM     PMH, SH, Medications/Allergies: reviewed, on chart. ROS:  Constitutional: No fever, chills or weight loss  Respiratory: No cough, SOB   CV: No chest pain, rare palpitations, no change.     Visit Vitals    /56 (BP 1 Location: Left arm, BP Patient Position: Sitting)    Pulse 82    Resp 16    Wt 194 lb 9.6 oz (88.3 kg)    SpO2 96%    BMI 46.92 kg/m2     Wt Readings from Last 3 Encounters:   10/05/17 194 lb 9.6 oz (88.3 kg)   08/04/17 192 lb 12.8 oz (87.5 kg)   06/02/17 195 lb (88.5 kg)   +2#  BP Readings from Last 3 Encounters:   10/05/17 120/56   08/04/17 172/88   06/02/17 169/68     Physical Examination: General appearance - alert, well appearing, and in no distress  Mental status - alert, oriented to person, place, and time  Eyes - pupils equal and reactive, extraocular eye movements intact  ENT - bilateral external ears and nose normal. Normal lips  Neck - supple, no significant adenopathy, no thyromegaly or mass  Lymphatics - no palpable lymphadenopathy, no hepatosplenomegaly  Chest - clear to auscultation, no wheezes, rales or rhonchi, symmetric air entry  Heart - normal rate, regular rhythm, normal S1, S2, no murmurs, rubs, clicks or gallops  Extremities - peripheral pulses normal, 1+ pedal edema, no clubbing or cyanosis, with bilat knee edema and medial ttp. No redness or warmth to LE's. A/P:  DJD knee L  No help from T3, not improved with injection L knee last visit. Not interested in surgery. Not a good candidate for NSAIDs due to hx GI bleed in recent past, HTN, CHD and anticoagulant use. Try norco 5, 1 tab BID PRN PAIN, #60/mo x3mo orders given.  reviewed, in goal. Check UDS today. HTN  In goal now. Cont current tx. Check BMP, adjust PRN    CHD and HLD  Stable since last visit, no anemia. gina meds well. Work on RF reduction. Afib with hx GI bleed hx, resolved. Blood count good at last check, no sx. Check CBC. Consider change norvasc 5mg to verapamil (would need to halve lipitor due to drug int though) if palpitations become more bothersome. Con't pepcid for GI prophy. Monitor. F/U 3mo.

## 2017-10-06 LAB
BASOPHILS # BLD AUTO: 0 X10E3/UL (ref 0–0.2)
BASOPHILS NFR BLD AUTO: 0 %
BUN SERPL-MCNC: 27 MG/DL (ref 8–27)
BUN/CREAT SERPL: 22 (ref 12–28)
CALCIUM SERPL-MCNC: 9.3 MG/DL (ref 8.7–10.3)
CHLORIDE SERPL-SCNC: 102 MMOL/L (ref 96–106)
CO2 SERPL-SCNC: 26 MMOL/L (ref 18–29)
CREAT SERPL-MCNC: 1.25 MG/DL (ref 0.57–1)
EOSINOPHIL # BLD AUTO: 0.4 X10E3/UL (ref 0–0.4)
EOSINOPHIL NFR BLD AUTO: 5 %
ERYTHROCYTE [DISTWIDTH] IN BLOOD BY AUTOMATED COUNT: 13.2 % (ref 12.3–15.4)
GLUCOSE SERPL-MCNC: 98 MG/DL (ref 65–99)
HCT VFR BLD AUTO: 38.7 % (ref 34–46.6)
HGB BLD-MCNC: 12.3 G/DL (ref 11.1–15.9)
IMM GRANULOCYTES # BLD: 0 X10E3/UL (ref 0–0.1)
IMM GRANULOCYTES NFR BLD: 0 %
LYMPHOCYTES # BLD AUTO: 2.4 X10E3/UL (ref 0.7–3.1)
LYMPHOCYTES NFR BLD AUTO: 30 %
MCH RBC QN AUTO: 31 PG (ref 26.6–33)
MCHC RBC AUTO-ENTMCNC: 31.8 G/DL (ref 31.5–35.7)
MCV RBC AUTO: 98 FL (ref 79–97)
MONOCYTES # BLD AUTO: 0.6 X10E3/UL (ref 0.1–0.9)
MONOCYTES NFR BLD AUTO: 8 %
NEUTROPHILS # BLD AUTO: 4.5 X10E3/UL (ref 1.4–7)
NEUTROPHILS NFR BLD AUTO: 57 %
PLATELET # BLD AUTO: 257 X10E3/UL (ref 150–379)
POTASSIUM SERPL-SCNC: 4.1 MMOL/L (ref 3.5–5.2)
RBC # BLD AUTO: 3.97 X10E6/UL (ref 3.77–5.28)
SODIUM SERPL-SCNC: 141 MMOL/L (ref 134–144)
WBC # BLD AUTO: 7.9 X10E3/UL (ref 3.4–10.8)

## 2017-10-11 LAB
AMPHETAMINES UR QL SCN: NEGATIVE NG/ML
BARBITURATES UR QL SCN: NEGATIVE NG/ML
BENZODIAZ UR QL: NEGATIVE NG/ML
BZE UR QL: NEGATIVE NG/ML
CANNABINOIDS UR QL SCN: NEGATIVE NG/ML
OPIATES UR QL: POSITIVE
PCP UR QL: NEGATIVE NG/ML

## 2018-01-19 ENCOUNTER — OFFICE VISIT (OUTPATIENT)
Dept: FAMILY MEDICINE CLINIC | Age: 83
End: 2018-01-19

## 2018-01-19 VITALS
WEIGHT: 198.6 LBS | HEART RATE: 88 BPM | SYSTOLIC BLOOD PRESSURE: 194 MMHG | OXYGEN SATURATION: 97 % | DIASTOLIC BLOOD PRESSURE: 96 MMHG | TEMPERATURE: 97.7 F | HEIGHT: 55 IN | BODY MASS INDEX: 45.96 KG/M2

## 2018-01-19 DIAGNOSIS — E78.00 PURE HYPERCHOLESTEROLEMIA: ICD-10-CM

## 2018-01-19 DIAGNOSIS — I25.10 CORONARY ARTERY DISEASE INVOLVING NATIVE CORONARY ARTERY OF NATIVE HEART WITHOUT ANGINA PECTORIS: ICD-10-CM

## 2018-01-19 DIAGNOSIS — I10 ESSENTIAL HYPERTENSION: Primary | ICD-10-CM

## 2018-01-19 DIAGNOSIS — I48.0 PAROXYSMAL ATRIAL FIBRILLATION (HCC): ICD-10-CM

## 2018-01-19 RX ORDER — SPIRONOLACTONE 25 MG/1
25 TABLET ORAL DAILY
Qty: 30 TAB | Refills: 11 | Status: SHIPPED | OUTPATIENT
Start: 2018-01-19 | End: 2019-02-12 | Stop reason: SDUPTHER

## 2018-01-19 NOTE — PROGRESS NOTES
Guy Cogan is a 80 y.o. female presenting for/with:    Knee Pain (left knee worse)    HPI:  Knee pain  Pt reports persistent, bothersome pain to the L anterior knee. No trauma or overuse. Not improved with T#3 in past, but did well on norco 5's, Taking about 1/day. Trino well. Injection last fall didn't help much. Hypertension. Blood pressures have con't to be high. Management at last visit included trying to boost aldactone to 25mg every day, but doesn't have that in her pill bag, and not sure if she's taking that. Current regimen: ACE inhibitor, beta-blocker, CCB, (aldactone), and thiazide diuretic. Symptoms include mild edema lately bilat to ankles and lower shins and sore medial knees bilat. Has been doing more stair climbing lately. Patient denies chest pain, palpitations, orthopnea. Lab review:   Lab Results   Component Value Date/Time    Sodium 141 10/05/2017 11:53 AM    Potassium 4.1 10/05/2017 11:53 AM    Chloride 102 10/05/2017 11:53 AM    CO2 26 10/05/2017 11:53 AM    Glucose 98 10/05/2017 11:53 AM    BUN 27 10/05/2017 11:53 AM    Creatinine 1.25 10/05/2017 11:53 AM    BUN/Creatinine ratio 22 10/05/2017 11:53 AM    GFR est AA 45 10/05/2017 11:53 AM    GFR est non-AA 39 10/05/2017 11:53 AM    Calcium 9.3 10/05/2017 11:53 AM     CMP at [facility] showed GFRaa >60, Cr 1, Na+ a little low at 132. LFT's ok. Atrial fibrillation, intermittent. Current symptoms: occ fluttering feelings  Current control agent: B-blocker (max dose coreg)  Current anticoagulant: ASA 81mg every day and plavix 75mg every day. History of bleeding problems: GI bleed 9/2015, with drop in HCT. H/H at [facility] ER 1/2017 13.3/40  Lab Results   Component Value Date/Time    HGB (POC) 13.9 12/21/2015 03:01 PM    HGB 12.3 10/05/2017 11:53 AM     CHD  No further hx bleed since 9/2015. Remains on ASA 81 and plavix (con't by cardio), b-blocker, statin, and diuretic. Trino well. No myalgias, arthralgias, unusual weakness.  Does have increased risk of recurrent GI bleed on double antiplt, and is on pepcid for GI prophy. No sx lately. Lab Results   Component Value Date/Time    Cholesterol, total 125 05/04/2017 10:16 AM    HDL Cholesterol 62 05/04/2017 10:16 AM    LDL, calculated 49 05/04/2017 10:16 AM    VLDL, calculated 14 05/04/2017 10:16 AM    Triglyceride 69 05/04/2017 10:16 AM     Lab Results   Component Value Date/Time    ALT (SGPT) 21 03/08/2016 11:31 AM    AST (SGOT) 27 03/08/2016 11:31 AM    Alk. phosphatase 102 03/08/2016 11:31 AM    Bilirubin, total 0.4 03/08/2016 11:31 AM     LFT's normal on check at [address] [place] [address] 1/2017. Hypertension. Blood pressures have been improving. Management at last visit included r/s zestoretic and con't other pills. Current regimen: ACE inhibitor, beta-blocker, calcium channel blocker, thiazide, and aldactone. Symptoms include occ chest pressure L upper chest at rest, not assoc with dyspnea, nausea, sweats. Happens about once a month, happens mostly a half hour or so after meals. Burps and sx resolve. Patient denies palpitations. Lab review:   Lab Results   Component Value Date/Time    Sodium 141 10/05/2017 11:53 AM    Potassium 4.1 10/05/2017 11:53 AM    Chloride 102 10/05/2017 11:53 AM    CO2 26 10/05/2017 11:53 AM    Glucose 98 10/05/2017 11:53 AM    BUN 27 10/05/2017 11:53 AM    Creatinine 1.25 10/05/2017 11:53 AM    BUN/Creatinine ratio 22 10/05/2017 11:53 AM    GFR est AA 45 10/05/2017 11:53 AM    GFR est non-AA 39 10/05/2017 11:53 AM    Calcium 9.3 10/05/2017 11:53 AM     PMH, SH, Medications/Allergies: reviewed, on chart. ROS:  Constitutional: No fever, chills or weight loss  Respiratory: No cough, SOB   CV: No chest pain, rare palpitations, no change.     Visit Vitals    BP (!) 194/96  Comment: large cuff    Pulse 88    Temp 97.7 °F (36.5 °C) (Oral)    Ht 4' 6\" (1.372 m)    Wt 198 lb 9.6 oz (90.1 kg)    SpO2 97%    BMI 47.88 kg/m2     Wt Readings from Last 3 Encounters:   01/19/18 198 lb 9.6 oz (90.1 kg)   10/05/17 194 lb 9.6 oz (88.3 kg)   08/04/17 192 lb 12.8 oz (87.5 kg)   +4#  BP Readings from Last 3 Encounters:   01/19/18 (!) 194/96   10/05/17 120/56   08/04/17 172/88     Physical Examination: General appearance - alert, well appearing, and in no distress  Mental status - alert, oriented to person, place, and time  Eyes - pupils equal and reactive, extraocular eye movements intact  ENT - bilateral external ears and nose normal. Normal lips  Neck - supple, no significant adenopathy, no thyromegaly or mass  Lymphatics - no palpable lymphadenopathy, no hepatosplenomegaly  Chest - clear to auscultation, no wheezes, rales or rhonchi, symmetric air entry  Heart - normal rate, regular rhythm, normal S1, S2, no murmurs, rubs, clicks or gallops  Extremities - peripheral pulses normal, 1+ pedal edema, no clubbing or cyanosis, with bilat knee edema and medial ttp. No redness or warmth to LE's. A/P:  DJD knee L  Improved with norco. Not interested in surgery. Not a good candidate for NSAIDs due to hx GI bleed in recent past, HTN, CHD and anticoagulant use. RF norco 5, 1 tab BID prn PAIN, #60/mo. Still has refills on that.  reviewed, in goal. UDS ok 10/2017. HTN  Was a lot better on the aldactone. Will restart at 25mg daily. Plan BMP at f/u in 2 weeks. CHD and HLD  Stable since last visit, no anemia. gina meds well. Work on RF reduction. Afib with hx GI bleed hx, resolved. Blood count good at last check, no sx. Plan rpt CBC sometime this fall. Consider change norvasc 5mg to verapamil (would need to halve lipitor due to drug int though) if palpitations become more bothersome. Con't pepcid for GI prophy. Monitor. F/U 2wk.

## 2018-01-19 NOTE — PATIENT INSTRUCTIONS
Learning About Low-Carbohydrate Diets for Weight Loss  What is a low-carbohydrate diet? Low-carb diets avoid foods that are high in carbohydrate. These high-carb foods include pasta, bread, rice, cereal, fruits, and starchy vegetables. Instead, these diets usually have you eat foods that are high in fat and protein. Many people lose weight quickly on a low-carb diet. But the early weight loss is water. People on this diet often gain the weight back after they start eating carbs again. Not all diet plans are safe or work well. A lot of the evidence shows that low-carb diets aren't healthy. That's because these diets often don't include healthy foods like fruits and vegetables. Losing weight safely means balancing protein, fat, and carbs with every meal and snack. And low-carb diets don't always provide the vitamins, minerals, and fiber you need. If you have a serious medical condition, talk to your doctor before you try any diet. These conditions include kidney disease, heart disease, type 2 diabetes, high cholesterol, and high blood pressure. If you are pregnant, it may not be safe for your baby if you are on a low-carb diet. How can you lose weight safely? You might have heard that a diet plan helped another person lose weight. But that doesn't mean that it will work for you. It is very hard to stay on a diet that includes lots of big changes in your eating habits. If you want to get to a healthy weight and stay there, making healthy lifestyle changes will often work better than dieting. These steps can help. · Make a plan for change. Work with your doctor to create a plan that is right for you. · See a dietitian. He or she can show you how to make healthy changes in your eating habits. · Manage stress. If you have a lot of stress in your life, it can be hard to focus on making healthy changes to your daily habits. · Track your food and activity.  You are likely to do better at losing weight if you keep track of what you eat and what you do. Follow-up care is a key part of your treatment and safety. Be sure to make and go to all appointments, and call your doctor if you are having problems. It's also a good idea to know your test results and keep a list of the medicines you take. Where can you learn more? Go to http://garrett-fay.info/. Enter A121 in the search box to learn more about \"Learning About Low-Carbohydrate Diets for Weight Loss. \"  Current as of: May 12, 2017  Content Version: 11.4  © 9781-1642 [employer]. Care instructions adapted under license by [employer] (which disclaims liability or warranty for this information). If you have questions about a medical condition or this instruction, always ask your healthcare professional. Robert Prim any warranty or liability for your use of this information.

## 2018-01-19 NOTE — MR AVS SNAPSHOT
[facility] 
 
 
 [address] [address] [phone] Patient: Hieu Crouch MRN: [id number] CPU:5/4/0620 Visit Information Date & Time Provider Department Dept. Phone Encounter #  
 1/19/2018 10:10 AM Thom Ramirez MD [address] [place] [address] [id number] Follow-up Instructions Return in about 2 weeks (around 2/2/2018). Follow-up and Disposition History Upcoming Health Maintenance Date Due  
 GLAUCOMA SCREENING Q2Y 3/5/1995 Pneumococcal 65+ Low/Medium Risk (2 of 2 - PPSV23) 1/17/2025* MEDICARE YEARLY EXAM 1/26/2018 DTaP/Tdap/Td series (2 - Td) 8/4/2027 *Topic was postponed. The date shown is not the original due date. Allergies as of 1/19/2018  Review Complete On: 1/19/2018 By: Thom Ramirez MD  
 No Known Allergies Current Immunizations  Reviewed on 8/4/2017 Name Date Influenza High Dose Vaccine PF 10/5/2017 11:30 AM, 11/7/2016 Influenza Vaccine 10/13/2015, 10/6/2015 Pneumococcal Conjugate (PCV-13) 12/21/2015 Pneumococcal Polysaccharide (PPSV-23) 10/26/1994 Not reviewed this visit You Were Diagnosed With   
  
 Codes Comments Essential hypertension    -  Primary ICD-10-CM: I10 
ICD-9-CM: 401.9 Coronary artery disease involving native coronary artery of native heart without angina pectoris     ICD-10-CM: I25.10 ICD-9-CM: 414.01   
 BMI 45.0-49.9, adult [facility])     ICD-10-CM: A30.01 
ICD-9-CM: V85.42 Paroxysmal atrial fibrillation (HCC)     ICD-10-CM: I48.0 ICD-9-CM: 427.31 Pure hypercholesterolemia     ICD-10-CM: E78.00 ICD-9-CM: 272.0 Vitals BP Pulse Temp Height(growth percentile) Weight(growth percentile) SpO2  
 (!) 194/96 88 97.7 °F (36.5 °C) (Oral) 4' 6\" (1.372 m) 198 lb 9.6 oz (90.1 kg) 97% BMI OB Status Smoking Status 47.88 kg/m2 Hysterectomy Former Smoker BMI and BSA Data Body Mass Index Body Surface Area  
 47.88 kg/m 2 1.85 m 2 Preferred Pharmacy Pharmacy Name Phone Carlene [facility]akta0 [phone] [phone] Your Updated Medication List  
  
   
This list is accurate as of: 1/19/18 10:59 AM.  Always use your most recent med list. amLODIPine 2.5 mg tablet Commonly known as:  [place] Pique Take 1 Tab by mouth daily. Indications: pressure and heart  
  
 aspirin delayed-release 81 mg tablet Take  by mouth daily. atorvastatin 40 mg tablet Commonly known as:  LIPITOR Take 1 Tab by mouth daily. Indications: cholesterol and heart  
  
 carvedilol 25 mg tablet Commonly known as:  Betsey Rings Take 1 Tab by mouth two (2) times a day. Indications: pressure and heart  
  
 clopidogrel 75 mg Tab Commonly known as:  PLAVIX Take 75 mg by mouth daily. famotidine 20 mg tablet Commonly known as:  PEPCID Take 1 Tab by mouth two (2) times a day. * HYDROcodone-acetaminophen 5-325 mg per tablet Commonly known as:  Merrill Staff Take 1 Tab by mouth two (2) times daily as needed for Pain. Max Daily Amount: 2 Tabs. Indications: arthritis * HYDROcodone-acetaminophen 5-325 mg per tablet Commonly known as:  Merrill Staff Take 1 Tab by mouth two (2) times daily as needed for Pain. Max Daily Amount: 2 Tabs. * HYDROcodone-acetaminophen 5-325 mg per tablet Commonly known as:  Merrill Staff Take 1 Tab by mouth two (2) times daily as needed for Pain. Max Daily Amount: 2 Tabs. lisinopril-hydroCHLOROthiazide 20-12.5 mg per tablet Commonly known as:  Sonjia Aus Take 2 Tabs by mouth daily. Indications: heart and pressure, replaces plain lisinopril  
  
 nitroglycerin 0.4 mg SL tablet Commonly known as:  NITROSTAT  
by SubLINGual route every five (5) minutes as needed for Chest Pain. spironolactone 25 mg tablet Commonly known as:  ALDACTONE  
 Take 1 Tab by mouth daily. Indications: pressure and heart * Notice: This list has 3 medication(s) that are the same as other medications prescribed for you. Read the directions carefully, and ask your doctor or other care provider to review them with you. Prescriptions Sent to Pharmacy Refills  
 spironolactone (ALDACTONE) 25 mg tablet 11 Sig: Take 1 Tab by mouth daily. Indications: pressure and heart Class: Normal  
 Pharmacy: [facility] [id number] #: [phone] Route: Oral  
  
Follow-up Instructions Return in about 2 weeks (around 2/2/2018). To-Do List   
 01/26/2018 Lab:  METABOLIC PANEL, BASIC Patient Instructions Learning About Low-Carbohydrate Diets for Weight Loss What is a low-carbohydrate diet? Low-carb diets avoid foods that are high in carbohydrate. These high-carb foods include pasta, bread, rice, cereal, fruits, and starchy vegetables. Instead, these diets usually have you eat foods that are high in fat and protein. Many people lose weight quickly on a low-carb diet. But the early weight loss is water. People on this diet often gain the weight back after they start eating carbs again. Not all diet plans are safe or work well. A lot of the evidence shows that low-carb diets aren't healthy. That's because these diets often don't include healthy foods like fruits and vegetables. Losing weight safely means balancing protein, fat, and carbs with every meal and snack. And low-carb diets don't always provide the vitamins, minerals, and fiber you need. If you have a serious medical condition, talk to your doctor before you try any diet. These conditions include kidney disease, heart disease, type 2 diabetes, high cholesterol, and high blood pressure. If you are pregnant, it may not be safe for your baby if you are on a low-carb diet. How can you lose weight safely? You might have heard that a diet plan helped another person lose weight. But that doesn't mean that it will work for you. It is very hard to stay on a diet that includes lots of big changes in your eating habits. If you want to get to a healthy weight and stay there, making healthy lifestyle changes will often work better than dieting. These steps can help. · Make a plan for change. Work with your doctor to create a plan that is right for you. · See a dietitian. He or she can show you how to make healthy changes in your eating habits. · Manage stress. If you have a lot of stress in your life, it can be hard to focus on making healthy changes to your daily habits. · Track your food and activity. You are likely to do better at losing weight if you keep track of what you eat and what you do. Follow-up care is a key part of your treatment and safety. Be sure to make and go to all appointments, and call your doctor if you are having problems. It's also a good idea to know your test results and keep a list of the medicines you take. Where can you learn more? Go to http://garrett-fay.info/. Enter A121 in the search box to learn more about \"Learning About Low-Carbohydrate Diets for Weight Loss. \" Current as of: May 12, 2017 Content Version: 11.4 © 8238-5408 Healthwise, Incorporated. Care instructions adapted under license by [employer] (which disclaims liability or warranty for this information). If you have questions about a medical condition or this instruction, always ask your healthcare professional. [place] [address] any warranty or liability for your use of this information. Introducing [facility]! Riri Linares introduces Trapit patient portal. Now you can access parts of your medical record, email your doctor's office, and request medication refills online. 1. In your internet browser, go to [email or web address] [email or web address] 2. Click on the First Time User? Click Here link in the Sign In box. You will see the New Member Sign Up page. 3. Enter your [employer] Access Code exactly as it appears below. You will not need to use this code after youve completed the sign-up process. If you do not sign up before the expiration date, you must request a new code. · [employer] Access Code: [email or web address] Expires: 4/19/2018 10:59 AM 
 
4. Enter the last four digits of your Social Security Number (xxxx) and Date of Birth (mm/dd/yyyy) as indicated and click Submit. You will be taken to the next sign-up page. 5. Create a Job[employer] ID. This will be your Job[employer] login ID and cannot be changed, so think of one that is secure and easy to remember. 6. Create a [employer] password. You can change your password at any time. 7. Enter your Password Reset Question and Answer. This can be used at a later time if you forget your password. 8. Enter your e-mail address. You will receive e-mail notification when new information is available in [address] 9. Click Sign Up. You can now view and download portions of your medical record. 10. Click the Download Summary menu link to download a portable copy of your medical information. If you have questions, please visit the Frequently Asked Questions section of the [employer] website. Remember, [employer] is NOT to be used for urgent needs. For medical emergencies, dial 911. Now available from your iPhone and Android! Please provide this summary of care documentation to your next provider. Your primary care clinician is listed as Jia Strickland. If you have any questions after today's visit, please call [phone].

## 2018-02-02 ENCOUNTER — OFFICE VISIT (OUTPATIENT)
Dept: FAMILY MEDICINE CLINIC | Age: 83
End: 2018-02-02

## 2018-02-02 VITALS
DIASTOLIC BLOOD PRESSURE: 72 MMHG | OXYGEN SATURATION: 96 % | TEMPERATURE: 97.5 F | HEART RATE: 78 BPM | SYSTOLIC BLOOD PRESSURE: 134 MMHG | HEIGHT: 55 IN | BODY MASS INDEX: 45.08 KG/M2 | RESPIRATION RATE: 20 BRPM | WEIGHT: 194.8 LBS

## 2018-02-02 DIAGNOSIS — G89.29 CHRONIC PAIN OF LEFT KNEE: ICD-10-CM

## 2018-02-02 DIAGNOSIS — M19.90 ARTHRITIS: ICD-10-CM

## 2018-02-02 DIAGNOSIS — Z13.31 SCREENING FOR DEPRESSION: ICD-10-CM

## 2018-02-02 DIAGNOSIS — I10 ESSENTIAL HYPERTENSION: Primary | ICD-10-CM

## 2018-02-02 DIAGNOSIS — Z00.00 MEDICARE ANNUAL WELLNESS VISIT, SUBSEQUENT: ICD-10-CM

## 2018-02-02 DIAGNOSIS — Z13.39 SCREENING FOR ALCOHOLISM: ICD-10-CM

## 2018-02-02 DIAGNOSIS — M25.562 CHRONIC PAIN OF LEFT KNEE: ICD-10-CM

## 2018-02-02 RX ORDER — HYDROCODONE BITARTRATE AND ACETAMINOPHEN 5; 325 MG/1; MG/1
1 TABLET ORAL
Qty: 60 TAB | Refills: 0 | Status: SHIPPED | OUTPATIENT
Start: 2018-03-24 | End: 2018-04-10 | Stop reason: SDUPTHER

## 2018-02-02 RX ORDER — HYDROCODONE BITARTRATE AND ACETAMINOPHEN 5; 325 MG/1; MG/1
1 TABLET ORAL
Qty: 60 TAB | Refills: 0 | Status: SHIPPED | OUTPATIENT
Start: 2018-02-22 | End: 2018-04-10 | Stop reason: SDUPTHER

## 2018-02-02 NOTE — PROGRESS NOTES
Sandy Ye is a 80 y.o. female presenting for/with:    Blood Pressure Check    HPI:  Knee pain  Pt reports doing better overall since using norco 5's. Using 1-2/day. Has just filled 3rd bottle from Oct order last week, has enough to last until late FEB. Injection last fall didn't help much. Hypertension. Blood pressures have improved a lot. Management at last visit included r/s aldactone to 25mg every day. Current regimen: ACE inhibitor, beta-blocker, CCB, aldactone, and thiazide diuretic. Symptoms include mild edema lately bilat to ankles and lower shins and sore medial knees bilat. Patient denies chest pain, palpitations, orthopnea. Lab review:   Lab Results   Component Value Date/Time    Sodium 141 10/05/2017 11:53 AM    Potassium 4.1 10/05/2017 11:53 AM    Chloride 102 10/05/2017 11:53 AM    CO2 26 10/05/2017 11:53 AM    Glucose 98 10/05/2017 11:53 AM    BUN 27 10/05/2017 11:53 AM    Creatinine 1.25 10/05/2017 11:53 AM    BUN/Creatinine ratio 22 10/05/2017 11:53 AM    GFR est AA 45 10/05/2017 11:53 AM    GFR est non-AA 39 10/05/2017 11:53 AM    Calcium 9.3 10/05/2017 11:53 AM     CMP at [facility] showed GFRaa >60, Cr 1, Na+ a little low at 132. LFT's ok. Atrial fibrillation, intermittent. Current symptoms: occ fluttering feelings  Current control agent: B-blocker (max dose coreg)  Current anticoagulant: ASA 81mg every day and plavix 75mg every day. History of bleeding problems: GI bleed 9/2015, with drop in HCT. H/H at [facility] ER 1/2017 13.3/40  Lab Results   Component Value Date/Time    HGB (POC) 13.9 12/21/2015 03:01 PM    HGB 12.3 10/05/2017 11:53 AM     CHD  No further hx bleed since 9/2015. Remains on ASA 81 and plavix (con't by cardio), b-blocker, statin, and diuretic. Trino well. No myalgias, arthralgias, unusual weakness. Does have increased risk of recurrent GI bleed on double antiplt, and is on pepcid for GI prophy. No sx lately.   Lab Results   Component Value Date/Time Cholesterol, total 125 05/04/2017 10:16 AM    HDL Cholesterol 62 05/04/2017 10:16 AM    LDL, calculated 49 05/04/2017 10:16 AM    VLDL, calculated 14 05/04/2017 10:16 AM    Triglyceride 69 05/04/2017 10:16 AM     Lab Results   Component Value Date/Time    ALT (SGPT) 21 03/08/2016 11:31 AM    AST (SGOT) 27 03/08/2016 11:31 AM    Alk. phosphatase 102 03/08/2016 11:31 AM    Bilirubin, total 0.4 03/08/2016 11:31 AM     LFT's normal on check at [address] [place] [address] 1/2017. Hypertension. Blood pressures have been improving. Management at last visit included r/s aldactone and con't zestoretic and con't other pills. Current regimen: ACE inhibitor, beta-blocker, calcium channel blocker, thiazide, and aldactone. Symptoms include occ chest pressure L upper chest at rest, not assoc with dyspnea, nausea, sweats. Happens about once a month, happens mostly a half hour or so after meals. Burps and sx resolve. Patient denies palpitations. Lab review:   Lab Results   Component Value Date/Time    Sodium 141 10/05/2017 11:53 AM    Potassium 4.1 10/05/2017 11:53 AM    Chloride 102 10/05/2017 11:53 AM    CO2 26 10/05/2017 11:53 AM    Glucose 98 10/05/2017 11:53 AM    BUN 27 10/05/2017 11:53 AM    Creatinine 1.25 10/05/2017 11:53 AM    BUN/Creatinine ratio 22 10/05/2017 11:53 AM    GFR est AA 45 10/05/2017 11:53 AM    GFR est non-AA 39 10/05/2017 11:53 AM    Calcium 9.3 10/05/2017 11:53 AM     PMH, SH, Medications/Allergies: reviewed, on chart. ROS:  Constitutional: No fever, chills or weight loss  Respiratory: No cough, SOB   CV: No chest pain, rare palpitations, no change.     Visit Vitals    /72 (BP 1 Location: Left arm, BP Patient Position: Sitting)    Pulse 78    Temp 97.5 °F (36.4 °C) (Temporal)    Resp 20    Ht 4' 6\" (1.372 m)    Wt 194 lb 12.8 oz (88.4 kg)    SpO2 96%    BMI 46.97 kg/m2     Wt Readings from Last 3 Encounters:   02/02/18 194 lb 12.8 oz (88.4 kg)   01/19/18 198 lb 9.6 oz (90.1 kg)   10/05/17 194 lb 9.6 oz (88.3 kg)   +4#  BP Readings from Last 3 Encounters:   02/02/18 134/72   01/19/18 (!) 194/96   10/05/17 120/56     Physical Examination: General appearance - alert, well appearing, and in no distress  Mental status - alert, oriented to person, place, and time  Eyes - pupils equal and reactive, extraocular eye movements intact  ENT - bilateral external ears and nose normal. Normal lips  Neck - supple, no significant adenopathy, no thyromegaly or mass  Lymphatics - no palpable lymphadenopathy, no hepatosplenomegaly  Chest - clear to auscultation, no wheezes, rales or rhonchi, symmetric air entry  Heart - normal rate, regular rhythm, normal S1, S2, no murmurs, rubs, clicks or gallops  Extremities - peripheral pulses normal, trace pedal edema, no clubbing or cyanosis, with bilat knee edema and medial ttp. No redness or warmth to LE's. A/P:  DJD knee L  Stable on norco. Not interested in surgery. Not a good candidate for NSAIDs due to hx GI bleed in recent past, HTN, CHD and anticoagulant use. RF norco 5, 1 tab BID prn PAIN, #60/mo x2 more mo, has one for Feb already, so should last until late April/Early May.  reviewed, in goal. UDS ok 10/2017. plan recheck next visit. HTN  Doing well now. con't current tx. Check BMP. CHD and HLD  Stable since last visit, no anemia. gina meds well. Work on RF reduction. Afib with hx GI bleed hx, resolved. Blood count good at last check, no sx. Plan rpt CBC fall 2018. Consider change norvasc 5mg to verapamil (would need to halve lipitor due to drug int though) if palpitations become more bothersome. Con't pepcid for GI prophy. Monitor. F/U 3mo.    ______________________________________________________________________    Sandyjean Ye is a 80 y.o. female and presents for annual Medicare Wellness Visit. Problem List: Reviewed with patient and discussed risk factors.     Patient Active Problem List   Diagnosis Code    Arthritis M19.90    S/P hysterectomy Z90.710    Hyperlipidemia E78.5    HTN (hypertension) I10    OA (osteoarthritis) M19.90    Lumbago with sciatica M54.40    Obesity E66.9    DVT (deep venous thrombosis) (HCC) I82.409    Coronary artery disease involving native coronary artery without angina pectoris I25.10    Paroxysmal atrial fibrillation (HCC) I48.0    Anticoagulant long-term use Z79.01    Advance directive discussed with patient Z71.89    BMI 45.0-49.9, adult ([facility] [address] G12.05       Current medical providers:  Patient Care Team:  Vanesa Nguyen MD as PCP - General (Family Practice)    PM, , Medications/Allergies: reviewed, on chart. Female Alcohol Screening: On any occasion during the past 3 months, have you had more than 3 drinks containing alcohol? No    Do you average more than 7 drinks per week? No  ROS:  Constitutional: No fever, chills or weight loss  Respiratory: No cough, SOB   CV: No chest pain or Palpitations    Objective:  Visit Vitals    /72 (BP 1 Location: Left arm, BP Patient Position: Sitting)    Pulse 78    Temp 97.5 °F (36.4 °C) (Temporal)    Resp 20    Ht 4' 6\" (1.372 m)    Wt 194 lb 12.8 oz (88.4 kg)    SpO2 96%    BMI 46.97 kg/m2    Body mass index is 46.97 kg/(m^2). Assessment of cognitive impairment: Alert and oriented x 3    Depression Screen:   PHQ over the last two weeks 1/19/2018   PHQ Not Done -   Little interest or pleasure in doing things Not at all   Feeling down, depressed or hopeless Not at all   Total Score PHQ 2 0       Fall Risk Assessment:    Fall Risk Assessment, last 12 mths 1/19/2018   Able to walk? Yes   Fall in past 12 months? No   Fall with injury? -   Number of falls in past 12 months -   Fall Risk Score -       Functional Ability:   Does the patient exhibit a steady gait? yes   How long did it take the patient to get up and walk from a sitting position?  2s   Is the patient self reliant?  (ie can do own laundry, meals, household chores)  yes     Does the patient handle his/her own medications? yes     Does the patient handle his/her own money? yes     Is the patients home safe (ie good lighting, handrails on stairs and bath, etc.)? yes     Did you notice or did patient express any hearing difficulties? no     Did you notice or did patient express any vision difficulties? no       Advance Care Planning:   Patient was offered the opportunity to discuss advance care planning:  yes     Does patient have an Advance Directive:  no   If no, did you provide information on Caring Connections? yes     Plan:      Orders Placed This Encounter    METABOLIC PANEL, BASIC    HYDROcodone-acetaminophen (NORCO) 5-325 mg per tablet    HYDROcodone-acetaminophen (NORCO) 5-325 mg per tablet       Health Maintenance   Topic Date Due    GLAUCOMA SCREENING Q2Y  03/05/1995    MEDICARE YEARLY EXAM  01/26/2018    Pneumococcal 65+ Low/Medium Risk (2 of 2 - PPSV23) 01/17/2025 (Originally 12/21/2016)    DTaP/Tdap/Td series (2 - Td) 08/04/2027    OSTEOPOROSIS SCREENING (DEXA)  Addressed    ZOSTER VACCINE AGE 60>  Addressed    Influenza Age 5 to Adult  Completed       *Patient verbalized understanding and agreement with the plan. A copy of the After Visit Summary with personalized health plan was given to the patient today.

## 2018-02-02 NOTE — PATIENT INSTRUCTIONS
If you have any questions regarding mychart, you may call [employer] support at [phone]. Medicare Wellness Visit, Female    The best way to live healthy is to have a healthy lifestyle by eating a well-balanced diet, exercising regularly, limiting alcohol and stopping smoking. Regular physical exams and screening tests are another way to keep healthy. Preventive exams provided by your health care provider can find health problems before they become diseases or illnesses. Preventive services including immunizations, screening tests, monitoring and exams can help you take care of your own health. All people over age 72 should have a pneumovax  and and a prevnar shot to prevent pneumonia. These are once in a lifetime unless you and your provider decide differently. All people over 65 should have a yearly flu shot and a tetanus vaccine every 10 years. A bone mass density to screen for osteoporosis or thinning of the bones should be done every 2 years after 65. Screening for diabetes mellitus with a blood sugar test should be done every year. Glaucoma is a disease of the eye due to increased ocular pressure that can lead to blindness and it should be done every year by an eye professional.    Cardiovascular screening tests that check for elevated lipids (fatty part of blood) which can lead to heart disease and strokes should be done every 5 years. Colorectal screening that evaluates for blood or polyps in your colon should be done yearly as a stool test or every five years as a flexible sigmoidoscope or every 10 years as a colonoscopy up to age 76. Breast cancer screening with a mammogram is recommended biennially  for women age 54-69. Screening for cervical cancer with a pap smear and pelvic exam is recommended for women after age 72 years every 2 years up to age 79 or when the provider and patient decide to stop. If there is a history of cervical abnormalities or other increased risk for cancer then the test is recommended yearly. Hepatitis C screening is also recommended for anyone born between [birth year] through [address]. A shingles vaccine is also recommended once in a lifetime after age 61. Your Medicare Wellness Exam is recommended annually.     Here is a list of your current Health Maintenance items with a due date:  Health Maintenance Due   Topic Date Due    Glaucoma Screening   03/05/1995    Annual Well Visit  01/26/2018

## 2018-02-02 NOTE — MR AVS SNAPSHOT
303 [facility] 
 
 
 1000 [facility] [address] [facility],5Th [address] [address] [phone] Patient: Sona Cheek MRN: [id number] Visit Information Date & Time Provider Department Dept. Phone Encounter #  
 2018  2:40 PM Latasha Ervin MD [address] [place] [address] [id number] Your Appointments 2018  2:40 PM  
ESTABLISHED PATIENT with Latasha Ervin MD  
[facility] [address] ([facility]) Appt Note: 2wk fu per Dr. Randi Miranda  
 1000 [facility]. [address] [facility],5Th [address] [address] [phone]  
  
   
 [address] [address] Upcoming Health Maintenance Date Due  
 GLAUCOMA SCREENING Q2Y 3/5/1995 MEDICARE YEARLY EXAM 2018 Pneumococcal 65+ Low/Medium Risk (2 of 2 - PPSV23) 2025* DTaP/Tdap/Td series (2 - Td) 2027 *Topic was postponed. The date shown is not the original due date. Allergies as of 2018  Review Complete On: 2018 By: Miriam Trevino LPN No Known Allergies Current Immunizations  Reviewed on 2017 Name Date Influenza High Dose Vaccine PF 10/5/2017 11:30 AM, 2016 Influenza Vaccine 10/13/2015, 10/6/2015 Pneumococcal Conjugate (PCV-13) 2015 Pneumococcal Polysaccharide (PPSV-23) 10/26/1994 Not reviewed this visit You Were Diagnosed With   
  
 Codes Comments Essential hypertension    -  Primary ICD-10-CM: I10 
ICD-9-CM: 401.9 Arthritis     ICD-10-CM: M19.90 ICD-9-CM: 716.90 Chronic pain of left knee     ICD-10-CM: M25.562, G89.29 ICD-9-CM: 719.46, 338.29 Vitals BP Pulse Temp Resp Height(growth percentile) 134/72 (BP 1 Location: Left arm, BP Patient Position: Sitting) 78 97.5 °F (36.4 °C) (Temporal) 20 4' 6\" (1.372 m) Weight(growth percentile) SpO2 BMI OB Status Smoking Status 194 lb 12.8 oz (88.4 kg) 96% 46.97 kg/m2 Hysterectomy Former Smoker Vitals History BMI and BSA Data Body Mass Index Body Surface Area  
 46.97 kg/m 2 1.84 m 2 Preferred Pharmacy Pharmacy Name Phone [facility] [phone] [phone] Your Updated Medication List  
  
   
This list is accurate as of: 2/2/18  2:30 PM.  Always use your most recent med list. amLODIPine 2.5 mg tablet Commonly known as:  Michelle Ko Take 1 Tab by mouth daily. Indications: pressure and heart  
  
 aspirin delayed-release 81 mg tablet Take  by mouth daily. atorvastatin 40 mg tablet Commonly known as:  LIPITOR Take 1 Tab by mouth daily. Indications: cholesterol and heart  
  
 carvedilol 25 mg tablet Commonly known as:  Sayra Pates Take 1 Tab by mouth two (2) times a day. Indications: pressure and heart  
  
 clopidogrel 75 mg Tab Commonly known as:  PLAVIX Take 75 mg by mouth daily. famotidine 20 mg tablet Commonly known as:  PEPCID Take 1 Tab by mouth two (2) times a day. * HYDROcodone-acetaminophen 5-325 mg per tablet Commonly known as:  Delia Fling Take 1 Tab by mouth two (2) times daily as needed for Pain. Max Daily Amount: 2 Tabs. * HYDROcodone-acetaminophen 5-325 mg per tablet Commonly known as:  Dleia Fling Take 1 Tab by mouth two (2) times daily as needed for Pain. Max Daily Amount: 2 Tabs. Indications: arthritis Start taking on:  2/22/2018  
  
 * HYDROcodone-acetaminophen 5-325 mg per tablet Commonly known as:  Delia Fling Take 1 Tab by mouth two (2) times daily as needed for Pain. Max Daily Amount: 2 Tabs. Start taking on:  3/24/2018  
  
 lisinopril-hydroCHLOROthiazide 20-12.5 mg per tablet Commonly known as:  Jean Javier Take 2 Tabs by mouth daily. Indications: heart and pressure, replaces plain lisinopril  
  
 nitroglycerin 0.4 mg SL tablet Commonly known as:  NITROSTAT  
by SubLINGual route every five (5) minutes as needed for Chest Pain. spironolactone 25 mg tablet Commonly known as:  ALDACTONE Take 1 Tab by mouth daily. Indications: pressure and heart * Notice: This list has 3 medication(s) that are the same as other medications prescribed for you. Read the directions carefully, and ask your doctor or other care provider to review them with you. Prescriptions Printed Refills HYDROcodone-acetaminophen (NORCO) 5-325 mg per tablet 0 Starting on: 2/22/2018 Sig: Take 1 Tab by mouth two (2) times daily as needed for Pain. Max Daily Amount: 2 Tabs. Indications: arthritis Class: Print Route: Oral  
 HYDROcodone-acetaminophen (NORCO) 5-325 mg per tablet 0 Starting on: 3/24/2018 Sig: Take 1 Tab by mouth two (2) times daily as needed for Pain. Max Daily Amount: 2 Tabs. Class: Print Route: Oral  
  
We Performed the Following METABOLIC PANEL, BASIC [06528 CPT(R)] Patient Instructions If you have any questions regarding Cameo, you may call Cameo support at [phone]. Introducing [facility] [place] [facility] SERVICES! Taye Venegas introduces [employer] patient portal. Now you can access parts of your medical record, email your doctor's office, and request medication refills online. 1. In your internet browser, go to [email or web address] 2. Click on the First Time User? Click Here link in the Sign In box. You will see the New Member Sign Up page. 3. Enter your [employer] Software Access Code exactly as it appears below. You will not need to use this code after youve completed the sign-up process. If you do not sign up before the expiration date, you must request a new code. · [employer] Access Code: [email or web address] Expires: 4/19/2018 10:59 AM 
 
4. Enter the last four digits of your Social Security Number (xxxx) and Date of Birth (mm/dd/yyyy) as indicated and click Submit. You will be taken to the next sign-up page. 5. Create a [employer] Sur[employer] Software ID.  This will be your [employer] login ID and cannot be changed, so think of one that is secure and easy to remember. 6. Create a Wireless Tech password. You can change your password at any time. 7. Enter your Password Reset Question and Answer. This can be used at a later time if you forget your password. 8. Enter your e-mail address. You will receive e-mail notification when new information is available in [address] 9. Click Sign Up. You can now view and download portions of your medical record. 10. Click the Download Summary menu link to download a portable copy of your medical information. If you have questions, please visit the Frequently Asked Questions section of the [employer] Tech website. Remember, [employer] Tech is NOT to be used for urgent needs. For medical emergencies, dial 911. Now available from your iPhone and Android! Please provide this summary of care documentation to your next provider. Your primary care clinician is listed as Mela Strickland. If you have any questions after today's visit, please call [phone].

## 2018-02-03 LAB
BUN SERPL-MCNC: 19 MG/DL (ref 8–27)
BUN/CREAT SERPL: 17 (ref 12–28)
CALCIUM SERPL-MCNC: 9.6 MG/DL (ref 8.7–10.3)
CHLORIDE SERPL-SCNC: 102 MMOL/L (ref 96–106)
CO2 SERPL-SCNC: 24 MMOL/L (ref 18–29)
CREAT SERPL-MCNC: 1.1 MG/DL (ref 0.57–1)
GFR SERPLBLD CREATININE-BSD FMLA CKD-EPI: 45 ML/MIN/1.73
GFR SERPLBLD CREATININE-BSD FMLA CKD-EPI: 52 ML/MIN/1.73
GLUCOSE SERPL-MCNC: 108 MG/DL (ref 65–99)
PLEASE NOTE:, 188601: NORMAL
POTASSIUM SERPL-SCNC: 4.5 MMOL/L (ref 3.5–5.2)
SODIUM SERPL-SCNC: 140 MMOL/L (ref 134–144)

## 2018-04-08 DIAGNOSIS — Z79.01 ANTICOAGULANT LONG-TERM USE: ICD-10-CM

## 2018-04-08 DIAGNOSIS — I48.0 PAROXYSMAL ATRIAL FIBRILLATION (HCC): ICD-10-CM

## 2018-04-09 RX ORDER — FAMOTIDINE 20 MG/1
TABLET, FILM COATED ORAL
Qty: 60 TAB | Refills: 11 | Status: SHIPPED | OUTPATIENT
Start: 2018-04-09 | End: 2018-07-06 | Stop reason: SDUPTHER

## 2018-04-10 ENCOUNTER — OFFICE VISIT (OUTPATIENT)
Dept: FAMILY MEDICINE CLINIC | Age: 83
End: 2018-04-10

## 2018-04-10 VITALS
OXYGEN SATURATION: 98 % | RESPIRATION RATE: 12 BRPM | HEIGHT: 55 IN | TEMPERATURE: 97.7 F | WEIGHT: 194 LBS | DIASTOLIC BLOOD PRESSURE: 82 MMHG | SYSTOLIC BLOOD PRESSURE: 150 MMHG | HEART RATE: 78 BPM | BODY MASS INDEX: 44.9 KG/M2

## 2018-04-10 DIAGNOSIS — Z51.81 THERAPEUTIC DRUG MONITORING: ICD-10-CM

## 2018-04-10 DIAGNOSIS — I10 ESSENTIAL HYPERTENSION: ICD-10-CM

## 2018-04-10 DIAGNOSIS — I48.0 PAROXYSMAL ATRIAL FIBRILLATION (HCC): Primary | ICD-10-CM

## 2018-04-10 DIAGNOSIS — I25.10 CORONARY ARTERY DISEASE INVOLVING NATIVE CORONARY ARTERY OF NATIVE HEART WITHOUT ANGINA PECTORIS: ICD-10-CM

## 2018-04-10 DIAGNOSIS — E78.00 PURE HYPERCHOLESTEROLEMIA: ICD-10-CM

## 2018-04-10 DIAGNOSIS — Z79.01 ANTICOAGULANT LONG-TERM USE: ICD-10-CM

## 2018-04-10 DIAGNOSIS — G89.29 CHRONIC PAIN OF LEFT KNEE: ICD-10-CM

## 2018-04-10 DIAGNOSIS — M19.90 ARTHRITIS: ICD-10-CM

## 2018-04-10 DIAGNOSIS — I82.592 CHRONIC DEEP VEIN THROMBOSIS (DVT) OF OTHER VEIN OF LEFT LOWER EXTREMITY (HCC): ICD-10-CM

## 2018-04-10 DIAGNOSIS — M25.562 CHRONIC PAIN OF LEFT KNEE: ICD-10-CM

## 2018-04-10 RX ORDER — HYDROCODONE BITARTRATE AND ACETAMINOPHEN 5; 325 MG/1; MG/1
1 TABLET ORAL
Qty: 60 TAB | Refills: 0 | Status: SHIPPED | OUTPATIENT
Start: 2018-06-09 | End: 2018-10-08 | Stop reason: SDUPTHER

## 2018-04-10 RX ORDER — HYDROCODONE BITARTRATE AND ACETAMINOPHEN 5; 325 MG/1; MG/1
1 TABLET ORAL
Qty: 60 TAB | Refills: 0 | Status: SHIPPED | OUTPATIENT
Start: 2018-05-10 | End: 2018-10-08 | Stop reason: SDUPTHER

## 2018-04-10 RX ORDER — LISINOPRIL AND HYDROCHLOROTHIAZIDE 12.5; 2 MG/1; MG/1
2 TABLET ORAL DAILY
Qty: 180 TAB | Refills: 3 | Status: SHIPPED | OUTPATIENT
Start: 2018-04-10 | End: 2019-04-11 | Stop reason: SDUPTHER

## 2018-04-10 RX ORDER — HYDROCODONE BITARTRATE AND ACETAMINOPHEN 5; 325 MG/1; MG/1
1 TABLET ORAL
Qty: 60 TAB | Refills: 0 | Status: SHIPPED | OUTPATIENT
Start: 2018-04-10 | End: 2018-10-08 | Stop reason: SDUPTHER

## 2018-04-10 NOTE — MR AVS SNAPSHOT
[address] [employer] [phone] Patient: Daren Beyer MRN: [id number] Visit Information Date & Time Provider Department Dept. Phone Encounter #  
 4/10/2018 10:10 AM Louis Novak [place] [address] [place] [id number] Follow-up Instructions Return in about 3 months (around 7/10/2018). Follow-up and Disposition History Upcoming Health Maintenance Date Due  
 GLAUCOMA SCREENING Q2Y 3/5/1995 Pneumococcal 65+ Low/Medium Risk (2 of 2 - PPSV23) 1/17/2025* MEDICARE YEARLY EXAM 2/3/2019 DTaP/Tdap/Td series (2 - Td) 8/4/2027 *Topic was postponed. The date shown is not the original due date. Allergies as of 4/10/2018  Review Complete On: 4/10/2018 By: Louis Novak MD  
 No Known Allergies Current Immunizations  Reviewed on 8/4/2017 Name Date Influenza High Dose Vaccine PF 10/5/2017 11:30 AM, 11/7/2016 Influenza Vaccine 10/13/2015, 10/6/2015 Pneumococcal Conjugate (PCV-13) 12/21/2015 Pneumococcal Polysaccharide (PPSV-23) 10/26/1994 Not reviewed this visit You Were Diagnosed With   
  
 Codes Comments Paroxysmal atrial fibrillation (HCC)    -  Primary ICD-10-CM: I48.0 ICD-9-CM: 427.31 Pure hypercholesterolemia     ICD-10-CM: E78.00 ICD-9-CM: 272.0 Chronic deep vein thrombosis (DVT) of other vein of left lower extremity (HCC)     ICD-10-CM: O26.575 ICD-9-CM: 453.50 Coronary artery disease involving native coronary artery of native heart without angina pectoris     ICD-10-CM: I25.10 ICD-9-CM: 414.01 Anticoagulant long-term use     ICD-10-CM: Z79.01 
ICD-9-CM: V58.61 Essential hypertension     ICD-10-CM: I10 
ICD-9-CM: 401.9 Arthritis     ICD-10-CM: M19.90 ICD-9-CM: 716.90 Chronic pain of left knee     ICD-10-CM: M25.562, G89.29 ICD-9-CM: 719.46, 338.29   
 Therapeutic drug monitoring     ICD-10-CM: Z51.81 
ICD-9-CM: V58.83 Vitals BP Pulse Temp Resp Height(growth percentile) 150/82 (BP 1 Location: Right arm, BP Patient Position: Sitting) 78 97.7 °F (36.5 °C) (Temporal) 12 4' 6\" (1.372 m) Weight(growth percentile) SpO2 BMI OB Status Smoking Status 194 lb (88 kg) 98% 46.78 kg/m2 Hysterectomy Former Smoker BMI and BSA Data Body Mass Index Body Surface Area 46.78 kg/m 2 1.83 m 2 Preferred Pharmacy [facility] Name Phone [phone] [facility] [phone] Your Updated Medication List  
  
   
This list is accurate as of 4/10/18 11:25 AM.  Always use your most recent med list. amLODIPine 2.5 mg tablet Commonly known as:  [place] Bars Take 1 Tab by mouth daily. Indications: pressure and heart  
  
 aspirin delayed-release 81 mg tablet Take  by mouth daily. atorvastatin 40 mg tablet Commonly known as:  LIPITOR Take 1 Tab by mouth daily. Indications: cholesterol and heart  
  
 carvedilol 25 mg tablet Commonly known as:  Aidee Ni Take 1 Tab by mouth two (2) times a day. Indications: pressure and heart  
  
 clopidogrel 75 mg Tab Commonly known as:  PLAVIX Take 75 mg by mouth daily. famotidine 20 mg tablet Commonly known as:  PEPCID  
take 1 tablet by mouth twice a day  
  
 * HYDROcodone-acetaminophen 5-325 mg per tablet Commonly known as:  [place] Take 1 Tab by mouth two (2) times daily as needed for Pain. Max Daily Amount: 2 Tabs. Indications: arthritis * HYDROcodone-acetaminophen 5-325 mg per tablet Commonly known as:  [place] Take 1 Tab by mouth two (2) times daily as needed for Pain. Max Daily Amount: 2 Tabs. Start taking on:  5/10/2018  
  
 * HYDROcodone-acetaminophen 5-325 mg per tablet Commonly known as:  [place] Take 1 Tab by mouth two (2) times daily as needed for Pain. Max Daily Amount: 2 Tabs. Start taking on:  6/9/2018  
  
 lisinopril-hydroCHLOROthiazide 20-12.5 mg per tablet Commonly known as:  Ginger Tenishaks Take 2 Tabs by mouth daily. Indications: heart and pressure, replaces plain lisinopril  
  
 nitroglycerin 0.4 mg SL tablet Commonly known as:  NITROSTAT  
by SubLINGual route every five (5) minutes as needed for Chest Pain. spironolactone 25 mg tablet Commonly known as:  ALDACTONE Take 1 Tab by mouth daily. Indications: pressure and heart * Notice: This list has 3 medication(s) that are the same as other medications prescribed for you. Read the directions carefully, and ask your doctor or other care provider to review them with you. Prescriptions Printed Refills HYDROcodone-acetaminophen (NORCO) 5-325 mg per tablet 0 Starting on: 6/9/2018 Sig: Take 1 Tab by mouth two (2) times daily as needed for Pain. Max Daily Amount: 2 Tabs. Class: Print Route: Oral  
 HYDROcodone-acetaminophen (NORCO) 5-325 mg per tablet 0 Sig: Take 1 Tab by mouth two (2) times daily as needed for Pain. Max Daily Amount: 2 Tabs. Indications: arthritis Class: Print Route: Oral  
 HYDROcodone-acetaminophen (NORCO) 5-325 mg per tablet 0 Starting on: 5/10/2018 Sig: Take 1 Tab by mouth two (2) times daily as needed for Pain. Max Daily Amount: 2 Tabs. Class: Print Route: Oral  
  
Prescriptions Sent to Pharmacy Refills  
 lisinopril-hydroCHLOROthiazide (PRINZIDE, ZESTORETIC) 20-12.5 mg per tablet 3 Sig: Take 2 Tabs by mouth daily. Indications: heart and pressure, replaces plain lisinopril Class: Normal  
 Pharmacy: [facility] [id number] #: [phone] Route: Oral  
  
We Performed the Following [address] [place] [address] MONITORING [[id number] Custom] Follow-up Instructions Return in about 3 months (around 7/10/2018). Patient Instructions Get the lisinopril HCT I've ordered from [facility]. That should help your pressure and swelling a lot. Patient Instructions History Introducing [facility] & HEALTH SERVICES! [employer] introduces [employer] patient portal. Now you can access parts of your medical record, email your doctor's office, and request medication refills online. 1. In your internet browser, go to [email or web address] 2. Click on the First Time User? Click Here link in the Sign In box. You will see the New Member Sign Up page. 3. Enter your Magoosh Access Code exactly as it appears below. You will not need to use this code after youve completed the sign-up process. If you do not sign up before the expiration date, you must request a new code. · Magoosh Access Code: [email or web address] Expires: 4/19/2018 11:59 AM 
 
4. Enter the last four digits of your Social Security Number (xxxx) and Date of Birth (mm/dd/yyyy) as indicated and click Submit. You will be taken to the next sign-up page. 5. Create a Magoosh ID. This will be your Magoosh login ID and cannot be changed, so think of one that is secure and easy to remember. 6. Create a Magoosh password. You can change your password at any time. 7. Enter your Password Reset Question and Answer. This can be used at a later time if you forget your password. 8. Enter your e-mail address. You will receive e-mail notification when new information is available in [address] 9. Click Sign Up. You can now view and download portions of your medical record. 10. Click the Download Summary menu link to download a portable copy of your medical information. If you have questions, please visit the Frequently Asked Questions section of the Magoosh website. Remember, Magoosh is NOT to be used for urgent needs. For medical emergencies, dial 911. Now available from your iPhone and Android! Please provide this summary of care documentation to your next provider. Your primary care clinician is listed as Samira Strickland. If you have any questions after today's visit, please call [phone].

## 2018-04-10 NOTE — PROGRESS NOTES
Grabiel Holloway is a 80 y.o. female presenting for/with:    Hypertension    HPI:  Knee pain  Pt reports doing well on norco 5's. Using 1-2/day. Injection fall 2017 didn't help much. Atrial fibrillation, intermittent. Current symptoms: occ fluttering feelings  Current control agent: B-blocker (max dose coreg)  Current anticoagulant: ASA 81mg every day and plavix 75mg every day. History of bleeding problems: GI bleed 9/2015, with drop in HCT. Lab Results   Component Value Date/Time    HGB (POC) 13.9 12/21/2015 03:01 PM    HGB 12.3 10/05/2017 11:53 AM     CHD  No bleed sx since 9/2015. Remains on ASA 81 and plavix (con't by cardio), b-blocker, statin, and diuretic. Trino well. No myalgias, arthralgias, unusual weakness. Does have increased risk of recurrent GI bleed on double antiplt, and is on pepcid for GI prophy. No sx lately. Lab Results   Component Value Date/Time    Cholesterol, total 125 05/04/2017 10:16 AM    HDL Cholesterol 62 05/04/2017 10:16 AM    LDL, calculated 49 05/04/2017 10:16 AM    VLDL, calculated 14 05/04/2017 10:16 AM    Triglyceride 69 05/04/2017 10:16 AM     Lab Results   Component Value Date/Time    ALT (SGPT) 21 03/08/2016 11:31 AM    AST (SGOT) 27 03/08/2016 11:31 AM    Alk. phosphatase 102 03/08/2016 11:31 AM    Bilirubin, total 0.4 03/08/2016 11:31 AM     Hypertension. Blood pressures have been up a little. Management at last visit included con't current tx, but close inspection of pt's pill bottles (which she brought today) reveals pt on plain lisinopril 20mg on an old Rx from Dr. Liyah Terry . Current regimen: ACE inhibitor, beta-blocker, calcium channel blocker, (thiazide), and aldactone. Symptoms include mild edema. Patient denies palpitations.   Lab review:   Lab Results   Component Value Date/Time    Sodium 140 02/02/2018 01:59 PM    Potassium 4.5 02/02/2018 01:59 PM    Chloride 102 02/02/2018 01:59 PM    CO2 24 02/02/2018 01:59 PM    Glucose 108 (H) 02/02/2018 01:59 PM    BUN 19 02/02/2018 01:59 PM    Creatinine 1.10 (H) 02/02/2018 01:59 PM    BUN/Creatinine ratio 17 02/02/2018 01:59 PM    GFR est AA 52 (L) 02/02/2018 01:59 PM    GFR est non-AA 45 (L) 02/02/2018 01:59 PM    Calcium 9.6 02/02/2018 01:59 PM     PMH, SH, Medications/Allergies: reviewed, on chart. ROS:  Constitutional: No fever, chills or weight loss  Respiratory: No cough, SOB   CV: No chest pain, rare palpitations, no change. Visit Vitals    /82 (BP 1 Location: Right arm, BP Patient Position: Sitting)    Pulse 78    Temp 97.7 °F (36.5 °C) (Temporal)    Resp 12    Ht 4' 6\" (1.372 m)    Wt 194 lb (88 kg)    SpO2 98%    BMI 46.78 kg/m2     Wt Readings from Last 3 Encounters:   04/10/18 194 lb (88 kg)   02/02/18 194 lb 12.8 oz (88.4 kg)   01/19/18 198 lb 9.6 oz (90.1 kg)   +0#  BP Readings from Last 3 Encounters:   04/10/18 150/82   02/02/18 134/72   01/19/18 (!) 194/96     Physical Examination: General appearance - alert, well appearing, and in no distress  Mental status - alert, oriented to person, place, and time  Eyes - pupils equal and reactive, extraocular eye movements intact  ENT - bilateral external ears and nose normal. Normal lips  Neck - supple, no significant adenopathy, no thyromegaly or mass  Lymphatics - no palpable lymphadenopathy, no hepatosplenomegaly  Chest - clear to auscultation, no wheezes, rales or rhonchi, symmetric air entry  Heart - normal rate, regular rhythm, normal S1, S2, no murmurs, rubs, clicks or gallops  Extremities - peripheral pulses normal, trace pedal edema, no clubbing or cyanosis, with bilat knee edema and medial ttp. No redness or warmth to LE's. A/P:  DJD knee L  Stable on norco. Not interested in surgery. Not a good candidate for NSAIDs due to hx GI bleed in recent past, HTN, CHD and anticoagulant use. RF norco 5, 1 tab BID prn PAIN, #60/mo x3 mo.  reviewed, in goal. UDS ok 10/2017. recheck today. HTN  Up again.  Reordered lisinopril HCT 20/12.5 2qd, should fix. Rest of meds stay the same. CHD and HLD  Stable since last visit, no anemia. gina meds well. Work on RF reduction. Plan lipids and CMP at f/u. Afib with hx GI bleed hx, resolved. Blood count good at last check, no sx. Plan rpt CBC fall 2018. Consider change norvasc 2.5mg to verapamil (would need to halve lipitor due to drug int though) if palpitations become more bothersome. Con't pepcid for GI prophy. Monitor. F/U 3mo.

## 2018-04-10 NOTE — PATIENT INSTRUCTIONS
Get the lisinopril HCT I've ordered from [facility] in [place]. That should help your pressure and swelling a lot.

## 2018-04-10 NOTE — PROGRESS NOTES
1. Have you been to the ER, urgent care clinic since your last visit? Hospitalized since your last visit? No    2. Have you seen or consulted any other health care providers outside of the [address] [place] [address] since your last visit? Include any pap smears or colon screening.  No

## 2018-04-14 LAB — DRUGS UR: NORMAL

## 2018-07-06 ENCOUNTER — OFFICE VISIT (OUTPATIENT)
Dept: FAMILY MEDICINE CLINIC | Age: 83
End: 2018-07-06

## 2018-07-06 VITALS
TEMPERATURE: 98 F | BODY MASS INDEX: 44.48 KG/M2 | OXYGEN SATURATION: 98 % | SYSTOLIC BLOOD PRESSURE: 110 MMHG | HEART RATE: 85 BPM | HEIGHT: 55 IN | DIASTOLIC BLOOD PRESSURE: 70 MMHG | WEIGHT: 192.2 LBS

## 2018-07-06 DIAGNOSIS — G89.29 CHRONIC PAIN OF LEFT KNEE: ICD-10-CM

## 2018-07-06 DIAGNOSIS — I25.10 CORONARY ARTERY DISEASE INVOLVING NATIVE CORONARY ARTERY OF NATIVE HEART WITHOUT ANGINA PECTORIS: ICD-10-CM

## 2018-07-06 DIAGNOSIS — Z79.01 ANTICOAGULANT LONG-TERM USE: ICD-10-CM

## 2018-07-06 DIAGNOSIS — M25.562 CHRONIC PAIN OF LEFT KNEE: ICD-10-CM

## 2018-07-06 DIAGNOSIS — E78.00 PURE HYPERCHOLESTEROLEMIA: ICD-10-CM

## 2018-07-06 DIAGNOSIS — I48.0 PAROXYSMAL ATRIAL FIBRILLATION (HCC): ICD-10-CM

## 2018-07-06 DIAGNOSIS — M19.90 ARTHRITIS: ICD-10-CM

## 2018-07-06 RX ORDER — ATORVASTATIN CALCIUM 40 MG/1
40 TABLET, FILM COATED ORAL DAILY
Qty: 30 TAB | Refills: 11 | Status: SHIPPED | OUTPATIENT
Start: 2018-07-06 | End: 2019-07-02 | Stop reason: SDDI

## 2018-07-06 RX ORDER — FAMOTIDINE 20 MG/1
TABLET, FILM COATED ORAL
Qty: 60 TAB | Refills: 11 | Status: SHIPPED | OUTPATIENT
Start: 2018-07-06 | End: 2019-05-04 | Stop reason: SDUPTHER

## 2018-07-06 NOTE — MR AVS SNAPSHOT
[address] [facility] [phone] Patient: Merlin Cumber MRN: [id number] Visit Information Date & Time Provider Department Dept. Phone Encounter #  
 7/6/2018 10:10 AM Idelle Felty, MD [address] [place] [address] [id number] Follow-up Instructions Return in about 3 months (around 10/6/2018). Follow-up and Disposition History Upcoming Health Maintenance Date Due Pneumococcal 65+ Low/Medium Risk (2 of 2 - PPSV23) 1/17/2025* Influenza Age 5 to Adult 8/1/2018 MEDICARE YEARLY EXAM 2/3/2019 GLAUCOMA SCREENING Q2Y 3/6/2020 DTaP/Tdap/Td series (2 - Td) 8/4/2027 *Topic was postponed. The date shown is not the original due date. Allergies as of 7/6/2018  Review Complete On: 7/6/2018 By: Idelle Felty, MD  
 No Known Allergies Current Immunizations  Reviewed on 8/4/2017 Name Date Influenza High Dose Vaccine PF 10/5/2017 11:30 AM, 11/7/2016 Influenza Vaccine 10/13/2015, 10/6/2015 Pneumococcal Conjugate (PCV-13) 12/21/2015 Pneumococcal Polysaccharide (PPSV-23) 10/26/1994 Not reviewed this visit You Were Diagnosed With   
  
 Codes Comments Arthritis     ICD-10-CM: M19.90 ICD-9-CM: 716.90 Chronic pain of left knee     ICD-10-CM: M25.562, G89.29 ICD-9-CM: 719.46, 338.29 Coronary artery disease involving native coronary artery of native heart without angina pectoris     ICD-10-CM: I25.10 ICD-9-CM: 414.01   
 Pure hypercholesterolemia     ICD-10-CM: E78.00 ICD-9-CM: 272.0 Paroxysmal atrial fibrillation (HCC)     ICD-10-CM: I48.0 ICD-9-CM: 427.31 Anticoagulant long-term use     ICD-10-CM: Z79.01 
ICD-9-CM: V58.61 Vitals  BP Pulse Temp Height(growth percentile) Weight(growth percentile) SpO2  
 110/70 (BP 1 Location: Left arm, BP Patient Position: Sitting) 85 98 °F (36.7 °C) (Temporal) 4' 6\" (1.372 m) 192 lb 3.2 oz (87.2 kg) 98% BMI OB Status Smoking Status 46.34 kg/m2 Hysterectomy Former Smoker Vitals History BMI and BSA Data Body Mass Index Body Surface Area  
 46.34 kg/m 2 1.82 m 2 Preferred Pharmacy Pharmacy Name Phone [facility] [address] [phone] Your Updated Medication List  
  
   
This list is accurate as of 7/6/18 10:55 AM.  Always use your most recent med list. amLODIPine 2.5 mg tablet Commonly known as:  Morgan Maggie Take 1 Tab by mouth daily. Indications: pressure and heart  
  
 aspirin delayed-release 81 mg tablet Take  by mouth daily. atorvastatin 40 mg tablet Commonly known as:  LIPITOR Take 1 Tab by mouth daily. Indications: cholesterol and heart  
  
 carvedilol 25 mg tablet Commonly known as:  Collins Liter Take 1 Tab by mouth two (2) times a day. Indications: pressure and heart  
  
 clopidogrel 75 mg Tab Commonly known as:  PLAVIX Take 75 mg by mouth daily. famotidine 20 mg tablet Commonly known as:  PEPCID  
take 1 tablet by mouth twice a day to prevent bleeding ulcers * HYDROcodone-acetaminophen 5-325 mg per tablet Commonly known as:  Christine Fothergill Take 1 Tab by mouth two (2) times daily as needed for Pain. Max Daily Amount: 2 Tabs. Indications: arthritis * HYDROcodone-acetaminophen 5-325 mg per tablet Commonly known as:  Christine Fothergill Take 1 Tab by mouth two (2) times daily as needed for Pain. Max Daily Amount: 2 Tabs. * HYDROcodone-acetaminophen 5-325 mg per tablet Commonly known as:  Christine Fothergill Take 1 Tab by mouth two (2) times daily as needed for Pain. Max Daily Amount: 2 Tabs. lisinopril-hydroCHLOROthiazide 20-12.5 mg per tablet Commonly known as:  Luis Trejo Take 2 Tabs by mouth daily. Indications: heart and pressure, replaces plain lisinopril  
  
 nitroglycerin 0.4 mg SL tablet Commonly known as:  NITROSTAT  
by SubLINGual route every five (5) minutes as needed for Chest Pain. spironolactone 25 mg tablet Commonly known as:  ALDACTONE Take 1 Tab by mouth daily. Indications: pressure and heart * Notice: This list has 3 medication(s) that are the same as other medications prescribed for you. Read the directions carefully, and ask your doctor or other care provider to review them with you. Prescriptions Sent to Pharmacy Refills  
 atorvastatin (LIPITOR) 40 mg tablet 11 Sig: Take 1 Tab by mouth daily. Indications: cholesterol and heart Class: Normal  
 Pharmacy: [facility] [id number] #: [phone] Route: Oral  
 famotidine (PEPCID) 20 mg tablet 11 Sig: take 1 tablet by mouth twice a day to prevent bleeding ulcers Class: Normal  
 Pharmacy: [facility] [address] [place] [address] Ph #: [phone] We Performed the Following LIPID PANEL [61575 CPT(R)] METABOLIC PANEL, COMPREHENSIVE [72135 CPT(R)] Follow-up Instructions Return in about 3 months (around 10/6/2018). Patient Instructions Learning About Low-Carbohydrate Diets for Weight Loss What is a low-carbohydrate diet? Low-carb diets avoid foods that are high in carbohydrate. These high-carb foods include pasta, bread, rice, cereal, fruits, and starchy vegetables. Instead, these diets usually have you eat foods that are high in fat and protein. Many people lose weight quickly on a low-carb diet. But the early weight loss is water. People on this diet often gain the weight back after they start eating carbs again. Not all diet plans are safe or work well. A lot of the evidence shows that low-carb diets aren't healthy. That's because these diets often don't include healthy foods like fruits and vegetables.  Losing weight safely means balancing protein, fat, and carbs with every meal and snack. And low-carb diets don't always provide the vitamins, minerals, and fiber you need. If you have a serious medical condition, talk to your doctor before you try any diet. These conditions include kidney disease, heart disease, type 2 diabetes, high cholesterol, and high blood pressure. If you are pregnant, it may not be safe for your baby if you are on a low-carb diet. How can you lose weight safely? You might have heard that a diet plan helped another person lose weight. But that doesn't mean that it will work for you. It is very hard to stay on a diet that includes lots of big changes in your eating habits. If you want to get to a healthy weight and stay there, making healthy lifestyle changes will often work better than dieting. These steps can help. · Make a plan for change. Work with your doctor to create a plan that is right for you. · See a dietitian. He or she can show you how to make healthy changes in your eating habits. · Manage stress. If you have a lot of stress in your life, it can be hard to focus on making healthy changes to your daily habits. · Track your food and activity. You are likely to do better at losing weight if you keep track of what you eat and what you do. Follow-up care is a key part of your treatment and safety. Be sure to make and go to all appointments, and call your doctor if you are having problems. It's also a good idea to know your test results and keep a list of the medicines you take. Where can you learn more? Go to http://garrett-fay.info/. Enter A121 in the search box to learn more about \"Learning About Low-Carbohydrate Diets for Weight Loss. \" Current as of: May 12, 2017 Content Version: 11.4 © 6632-0000 Healthwise, Incorporated.  Care instructions adapted under license by [employer] (which disclaims liability or warranty for this information). If you have questions about a medical condition or this instruction, always ask your healthcare professional. [address] any warranty or liability for your use of this information. If you have any questions regarding [employer], you may call [employer] support at [phone]. Patient Instructions History Introducing [facility]! [employer] introduces [employer] patient portal. Now you can access parts of your medical record, email your doctor's office, and request medication refills online. 1. In your internet browser, go to [email or web address] 2. Click on the First Time User? Click Here link in the Sign In box. You will see the New Member Sign Up page. 3. Enter your [employer] Access Code exactly as it appears below. You will not need to use this code after youve completed the sign-up process. If you do not sign up before the expiration date, you must request a new code. · [employer] Access Code: [email or web address] Expires: 10/4/2018  9:58 AM 
 
4. Enter the last four digits of your Social Security Number (xxxx) and Date of Birth (mm/dd/yyyy) as indicated and click Submit. You will be taken to the next sign-up page. 5. Create a [employer] ID. This will be your [employer] login ID and cannot be changed, so think of one that is secure and easy to remember. 6. Create a [employer] password. You can change your password at any time. 7. Enter your Password Reset Question and Answer. This can be used at a later time if you forget your password. 8. Enter your e-mail address. You will receive e-mail notification when new information is available in [address] 9. Click Sign Up. You can now view and download portions of your medical record. 10. Click the Download Summary menu link to download a portable copy of your medical information.  
 
If you have questions, please visit the Frequently Asked Questions section of the [employer]. Remember, [employer] is NOT to be used for urgent needs. For medical emergencies, dial 911. Now available from your iPhone and Android! Please provide this summary of care documentation to your next provider. Your primary care clinician is listed as Bernardo Strickland. If you have any questions after today's visit, please call [phone].

## 2018-07-06 NOTE — PROGRESS NOTES
1. Have you been to the ER, urgent care clinic since your last visit? Hospitalized since your last visit? No    2. Have you seen or consulted any other health care providers outside of the [address] [place] [address] since your last visit? Include any pap smears or colon screening.  No

## 2018-07-06 NOTE — PATIENT INSTRUCTIONS
Learning About Low-Carbohydrate Diets for Weight Loss  What is a low-carbohydrate diet? Low-carb diets avoid foods that are high in carbohydrate. These high-carb foods include pasta, bread, rice, cereal, fruits, and starchy vegetables. Instead, these diets usually have you eat foods that are high in fat and protein. Many people lose weight quickly on a low-carb diet. But the early weight loss is water. People on this diet often gain the weight back after they start eating carbs again. Not all diet plans are safe or work well. A lot of the evidence shows that low-carb diets aren't healthy. That's because these diets often don't include healthy foods like fruits and vegetables. Losing weight safely means balancing protein, fat, and carbs with every meal and snack. And low-carb diets don't always provide the vitamins, minerals, and fiber you need. If you have a serious medical condition, talk to your doctor before you try any diet. These conditions include kidney disease, heart disease, type 2 diabetes, high cholesterol, and high blood pressure. If you are pregnant, it may not be safe for your baby if you are on a low-carb diet. How can you lose weight safely? You might have heard that a diet plan helped another person lose weight. But that doesn't mean that it will work for you. It is very hard to stay on a diet that includes lots of big changes in your eating habits. If you want to get to a healthy weight and stay there, making healthy lifestyle changes will often work better than dieting. These steps can help. · Make a plan for change. Work with your doctor to create a plan that is right for you. · See a dietitian. He or she can show you how to make healthy changes in your eating habits. · Manage stress. If you have a lot of stress in your life, it can be hard to focus on making healthy changes to your daily habits. · Track your food and activity.  You are likely to do better at losing weight if you keep track of what you eat and what you do. Follow-up care is a key part of your treatment and safety. Be sure to make and go to all appointments, and call your doctor if you are having problems. It's also a good idea to know your test results and keep a list of the medicines you take. Where can you learn more? Go to http://garrett-fay.info/. Enter A121 in the search box to learn more about \"Learning About Low-Carbohydrate Diets for Weight Loss. \"  Current as of: May 12, 2017  Content Version: 11.4  © 1806-0713 "[employer]. Care instructions adapted under license by [employer] (which disclaims liability or warranty for this information). If you have questions about a medical condition or this instruction, always ask your healthcare professional. [address] any warranty or liability for your use of this information. If you have any questions regarding [employer], you may call [employer] support at [phone].

## 2018-07-06 NOTE — PROGRESS NOTES
Radhika Gonzalez is a 80 y.o. female presenting for/with:    Hypertension and Knee Pain (left knee )    HPI:  Knee pain  Pt reports doing well on norco 5's. Using 1-2/day. Injection fall 2017 didn't help much. Still     Atrial fibrillation, intermittent. Current symptoms: occ fluttering feelings  Current control agent: B-blocker (max dose coreg)  Current anticoagulant: ASA 81mg every day and plavix 75mg every day. History of bleeding problems: GI bleed 9/2015, with drop in HCT. Lab Results   Component Value Date/Time    HGB (POC) 13.9 12/21/2015 03:01 PM    HGB 12.3 10/05/2017 11:53 AM     CHD  No bleed sx since 9/2015. Remains on ASA 81 and plavix (con't by cardio), b-blocker, statin, and diuretic. Trino well. No myalgias, arthralgias, unusual weakness. Does have increased risk of recurrent GI bleed on double antiplt, and is on pepcid for GI prophy. No sx lately. Lab Results   Component Value Date/Time    Cholesterol, total 125 05/04/2017 10:16 AM    HDL Cholesterol 62 05/04/2017 10:16 AM    LDL, calculated 49 05/04/2017 10:16 AM    VLDL, calculated 14 05/04/2017 10:16 AM    Triglyceride 69 05/04/2017 10:16 AM     Lab Results   Component Value Date/Time    ALT (SGPT) 21 03/08/2016 11:31 AM    AST (SGOT) 27 03/08/2016 11:31 AM    Alk. phosphatase 102 03/08/2016 11:31 AM    Bilirubin, total 0.4 03/08/2016 11:31 AM     Hypertension. Blood pressures have been better. Management at last visit included r/s lisinopril HCT. Current regimen: ACE inhibitor, beta-blocker, calcium channel blocker, thiazide, and aldactone. Symptoms include mild edema. Patient denies palpitations.   Lab review:   Lab Results   Component Value Date/Time    Sodium 140 02/02/2018 01:59 PM    Potassium 4.5 02/02/2018 01:59 PM    Chloride 102 02/02/2018 01:59 PM    CO2 24 02/02/2018 01:59 PM    Glucose 108 (H) 02/02/2018 01:59 PM    BUN 19 02/02/2018 01:59 PM    Creatinine 1.10 (H) 02/02/2018 01:59 PM    BUN/Creatinine ratio 17 02/02/2018 01:59 PM    GFR est AA 52 (L) 02/02/2018 01:59 PM    GFR est non-AA 45 (L) 02/02/2018 01:59 PM    Calcium 9.6 02/02/2018 01:59 PM     PMH, SH, Medications/Allergies: reviewed, on chart. ROS:  Constitutional: No fever, chills or weight loss  Respiratory: No cough, SOB   CV: No chest pain, rare palpitations, no change. Visit Vitals    /70 (BP 1 Location: Left arm, BP Patient Position: Sitting)    Pulse 85    Temp 98 °F (36.7 °C) (Temporal)    Ht 4' 6\" (1.372 m)    Wt 192 lb 3.2 oz (87.2 kg)    SpO2 98%    BMI 46.34 kg/m2     Wt Readings from Last 3 Encounters:   07/06/18 192 lb 3.2 oz (87.2 kg)   04/10/18 194 lb (88 kg)   02/02/18 194 lb 12.8 oz (88.4 kg)   -2#  BP Readings from Last 3 Encounters:   07/06/18 110/70   04/10/18 150/82   02/02/18 134/72     Physical Examination: General appearance - alert, well appearing, and in no distress  Mental status - alert, oriented to person, place, and time  Eyes - pupils equal and reactive, extraocular eye movements intact  ENT - bilateral external ears and nose normal. Normal lips  Neck - supple, no significant adenopathy, no thyromegaly or mass  Lymphatics - no palpable lymphadenopathy, no hepatosplenomegaly  Chest - clear to auscultation, no wheezes, rales or rhonchi, symmetric air entry  Heart - normal rate, regular rhythm, normal S1, S2, no murmurs, rubs, clicks or gallops  Extremities - peripheral pulses normal, trace pedal edema, no clubbing or cyanosis, with bilat knee edema and medial ttp. No redness or warmth to LE's. A/P:  DJD knee L  Stable on norco. Has plenty for now, can renew on request (norco 5, 1 tab BID prn PAIN, #60/mo x3 mo). Not interested in surgery. Not a good candidate for NSAIDs due to hx GI bleed in recent past, HTN, CHD and anticoagulant use.  reviewed, in goal. UDS ok 4/2018, next check due fall. HTN  Great now. con't current tx. CHD and HLD  Stable since last visit, no anemia. gina meds well. Work on RF reduction. Check lipids and CMP. Afib with hx GI bleed hx, resolved. Blood count good at last check, no sx. Plan rpt CBC fall 2018. Consider change norvasc 2.5mg to verapamil (would need to halve lipitor due to drug int though) if palpitations become more bothersome. Con't pepcid for GI prophy. Monitor. F/U 3mo.

## 2018-07-07 LAB
ALBUMIN SERPL-MCNC: 4.3 G/DL (ref 3.5–4.7)
ALBUMIN/GLOB SERPL: 1.4 {RATIO} (ref 1.2–2.2)
ALP SERPL-CCNC: 107 IU/L (ref 39–117)
ALT SERPL-CCNC: 13 IU/L (ref 0–32)
AST SERPL-CCNC: 20 IU/L (ref 0–40)
BILIRUB SERPL-MCNC: 0.6 MG/DL (ref 0–1.2)
BUN SERPL-MCNC: 27 MG/DL (ref 8–27)
BUN/CREAT SERPL: 19 (ref 12–28)
CALCIUM SERPL-MCNC: 9.7 MG/DL (ref 8.7–10.3)
CHLORIDE SERPL-SCNC: 102 MMOL/L (ref 96–106)
CHOLEST SERPL-MCNC: 126 MG/DL (ref 100–199)
CO2 SERPL-SCNC: 24 MMOL/L (ref 20–29)
CREAT SERPL-MCNC: 1.41 MG/DL (ref 0.57–1)
GLOBULIN SER CALC-MCNC: 3.1 G/DL (ref 1.5–4.5)
GLUCOSE SERPL-MCNC: 115 MG/DL (ref 65–99)
HDLC SERPL-MCNC: 64 MG/DL
LDLC SERPL CALC-MCNC: 51 MG/DL (ref 0–99)
POTASSIUM SERPL-SCNC: 5 MMOL/L (ref 3.5–5.2)
PROT SERPL-MCNC: 7.4 G/DL (ref 6–8.5)
SODIUM SERPL-SCNC: 140 MMOL/L (ref 134–144)
TRIGL SERPL-MCNC: 56 MG/DL (ref 0–149)
VLDLC SERPL CALC-MCNC: 11 MG/DL (ref 5–40)

## 2018-12-08 DIAGNOSIS — I25.10 CORONARY ARTERY DISEASE INVOLVING NATIVE CORONARY ARTERY WITHOUT ANGINA PECTORIS: ICD-10-CM

## 2018-12-08 DIAGNOSIS — I10 ESSENTIAL HYPERTENSION: ICD-10-CM

## 2018-12-10 RX ORDER — CARVEDILOL 25 MG/1
TABLET ORAL
Qty: 180 TAB | Refills: 3 | Status: SHIPPED | OUTPATIENT
Start: 2018-12-10 | End: 2019-11-29 | Stop reason: SDUPTHER

## 2019-05-01 PROBLEM — Z87.19 HISTORY OF GI BLEED: Status: ACTIVE | Noted: 2019-05-01

## 2019-05-30 PROBLEM — R60.9 PERIPHERAL EDEMA: Status: ACTIVE | Noted: 2019-05-30

## 2019-07-09 PROBLEM — I82.402 LEFT LEG DVT (HCC): Status: ACTIVE | Noted: 2019-07-01

## 2019-11-29 DIAGNOSIS — I25.10 CORONARY ARTERY DISEASE INVOLVING NATIVE CORONARY ARTERY WITHOUT ANGINA PECTORIS: ICD-10-CM

## 2019-11-29 DIAGNOSIS — I10 ESSENTIAL HYPERTENSION: ICD-10-CM

## 2019-11-29 RX ORDER — CARVEDILOL 25 MG/1
TABLET ORAL
Qty: 180 TAB | Refills: 3 | Status: SHIPPED | OUTPATIENT
Start: 2019-11-29 | End: 2019-12-23

## 2020-11-20 PROBLEM — E66.01 OBESITY, MORBID (HCC): Status: ACTIVE | Noted: 2020-11-20

## 2021-01-01 ENCOUNTER — OFFICE VISIT (OUTPATIENT)
Dept: FAMILY MEDICINE CLINIC | Age: 86
End: 2021-01-01
Payer: MEDICARE

## 2021-01-01 ENCOUNTER — TELEPHONE (OUTPATIENT)
Dept: FAMILY MEDICINE CLINIC | Age: 86
End: 2021-01-01

## 2021-01-01 ENCOUNTER — DOCUMENTATION ONLY (OUTPATIENT)
Dept: FAMILY MEDICINE CLINIC | Age: 86
End: 2021-01-01

## 2021-01-01 VITALS
OXYGEN SATURATION: 78 % | SYSTOLIC BLOOD PRESSURE: 120 MMHG | HEART RATE: 100 BPM | RESPIRATION RATE: 22 BRPM | DIASTOLIC BLOOD PRESSURE: 80 MMHG | WEIGHT: 176 LBS | BODY MASS INDEX: 40.73 KG/M2 | TEMPERATURE: 98 F | HEIGHT: 55 IN

## 2021-01-01 VITALS
TEMPERATURE: 97.2 F | OXYGEN SATURATION: 98 % | RESPIRATION RATE: 18 BRPM | HEIGHT: 55 IN | SYSTOLIC BLOOD PRESSURE: 120 MMHG | DIASTOLIC BLOOD PRESSURE: 60 MMHG | WEIGHT: 176 LBS | BODY MASS INDEX: 40.73 KG/M2 | HEART RATE: 88 BPM
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
## 2021-01-01 DIAGNOSIS — R60.0 LOWER EXTREMITY EDEMA: ICD-10-CM

## 2021-01-01 DIAGNOSIS — I48.0 PAROXYSMAL ATRIAL FIBRILLATION (HCC): ICD-10-CM

## 2021-01-01 DIAGNOSIS — I25.10 CORONARY ARTERY DISEASE INVOLVING NATIVE CORONARY ARTERY OF NATIVE HEART WITHOUT ANGINA PECTORIS: ICD-10-CM

## 2021-01-01 DIAGNOSIS — I50.9 CHRONIC CONGESTIVE HEART FAILURE, UNSPECIFIED HEART FAILURE TYPE (HCC): ICD-10-CM

## 2021-01-01 DIAGNOSIS — N30.01 ACUTE CYSTITIS WITH HEMATURIA: Primary | ICD-10-CM
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
## 2021-01-01 DIAGNOSIS — R26.81 GAIT INSTABILITY: ICD-10-CM

## 2021-01-01 DIAGNOSIS — I10 PRIMARY HYPERTENSION: ICD-10-CM

## 2021-01-01 DIAGNOSIS — E78.00 PURE HYPERCHOLESTEROLEMIA: ICD-10-CM

## 2021-01-01 DIAGNOSIS — M17.0 PRIMARY OSTEOARTHRITIS OF BOTH KNEES: ICD-10-CM

## 2021-01-01 DIAGNOSIS — M19.90 ARTHRITIS: ICD-10-CM

## 2021-01-01 DIAGNOSIS — R60.0 PEDAL EDEMA: ICD-10-CM

## 2021-01-01 DIAGNOSIS — D50.8 OTHER IRON DEFICIENCY ANEMIA: ICD-10-CM

## 2021-01-01 DIAGNOSIS — I25.10 CORONARY ARTERY DISEASE INVOLVING NATIVE CORONARY ARTERY OF NATIVE HEART WITHOUT ANGINA PECTORIS: Primary | ICD-10-CM

## 2021-01-01 DIAGNOSIS — R29.898 MUSCULAR DECONDITIONING: ICD-10-CM

## 2021-01-01 DIAGNOSIS — Z23 ENCOUNTER FOR IMMUNIZATION: ICD-10-CM

## 2021-01-01 DIAGNOSIS — R35.0 FREQUENCY OF URINATION: ICD-10-CM

## 2021-01-01 DIAGNOSIS — E66.01 CLASS 3 SEVERE OBESITY DUE TO EXCESS CALORIES WITH SERIOUS COMORBIDITY AND BODY MASS INDEX (BMI) OF 40.0 TO 44.9 IN ADULT (HCC): ICD-10-CM

## 2021-01-01 DIAGNOSIS — I10 ESSENTIAL HYPERTENSION: ICD-10-CM

## 2021-01-01 DIAGNOSIS — Z87.19 HISTORY OF GI BLEED: ICD-10-CM

## 2021-01-01 LAB
ANION GAP SERPL CALC-SCNC: 3 MMOL/L (ref 5–15)
ANION GAP SERPL CALC-SCNC: 4 MMOL/L (ref 5–15)
BASOPHILS # BLD: 0 K/UL (ref 0–0.1)
BASOPHILS NFR BLD: 0 % (ref 0–1)
BILIRUB UR QL STRIP: NEGATIVE
BUN SERPL-MCNC: 38 MG/DL (ref 6–20)
BUN SERPL-MCNC: 48 MG/DL (ref 6–20)
BUN/CREAT SERPL: 22 (ref 12–20)
BUN/CREAT SERPL: 28 (ref 12–20)
CALCIUM SERPL-MCNC: 9 MG/DL (ref 8.5–10.1)
CALCIUM SERPL-MCNC: 9.4 MG/DL (ref 8.5–10.1)
CHLORIDE SERPL-SCNC: 106 MMOL/L (ref 97–108)
CHLORIDE SERPL-SCNC: 107 MMOL/L (ref 97–108)
CHOLEST SERPL-MCNC: 133 MG/DL
CO2 SERPL-SCNC: 27 MMOL/L (ref 21–32)
CO2 SERPL-SCNC: 31 MMOL/L (ref 21–32)
CREAT SERPL-MCNC: 1.69 MG/DL (ref 0.55–1.02)
CREAT SERPL-MCNC: 1.72 MG/DL (ref 0.55–1.02)
DIFFERENTIAL METHOD BLD: ABNORMAL
EOSINOPHIL # BLD: 0.2 K/UL (ref 0–0.4)
EOSINOPHIL NFR BLD: 2 % (ref 0–7)
ERYTHROCYTE [DISTWIDTH] IN BLOOD BY AUTOMATED COUNT: 12.5 % (ref 11.5–14.5)
GLUCOSE SERPL-MCNC: 106 MG/DL (ref 65–100)
GLUCOSE SERPL-MCNC: 85 MG/DL (ref 65–100)
GLUCOSE UR-MCNC: NEGATIVE MG/DL
HCT VFR BLD AUTO: 38.9 % (ref 35–47)
HDLC SERPL-MCNC: 55 MG/DL
HDLC SERPL: 2.4 {RATIO} (ref 0–5)
HGB BLD-MCNC: 12.4 G/DL (ref 11.5–16)
IMM GRANULOCYTES # BLD AUTO: 0 K/UL (ref 0–0.04)
IMM GRANULOCYTES NFR BLD AUTO: 0 % (ref 0–0.5)
IRON SATN MFR SERPL: 38 % (ref 20–50)
IRON SERPL-MCNC: 100 UG/DL (ref 35–150)
KETONES P FAST UR STRIP-MCNC: NEGATIVE MG/DL
LDLC SERPL CALC-MCNC: 59.2 MG/DL (ref 0–100)
LYMPHOCYTES # BLD: 2.4 K/UL (ref 0.8–3.5)
LYMPHOCYTES NFR BLD: 28 % (ref 12–49)
MCH RBC QN AUTO: 31.8 PG (ref 26–34)
MCHC RBC AUTO-ENTMCNC: 31.9 G/DL (ref 30–36.5)
MCV RBC AUTO: 99.7 FL (ref 80–99)
MONOCYTES # BLD: 0.6 K/UL (ref 0–1)
MONOCYTES NFR BLD: 7 % (ref 5–13)
NEUTS SEG # BLD: 5.3 K/UL (ref 1.8–8)
NEUTS SEG NFR BLD: 63 % (ref 32–75)
NRBC # BLD: 0 K/UL (ref 0–0.01)
NRBC BLD-RTO: 0 PER 100 WBC
PH UR STRIP: 6 [PH] (ref 4.6–8)
PLATELET # BLD AUTO: 228 K/UL (ref 150–400)
PMV BLD AUTO: 11.6 FL (ref 8.9–12.9)
POTASSIUM SERPL-SCNC: 3.9 MMOL/L (ref 3.5–5.1)
POTASSIUM SERPL-SCNC: 4.3 MMOL/L (ref 3.5–5.1)
PROT UR QL STRIP: NORMAL
RBC # BLD AUTO: 3.9 M/UL (ref 3.8–5.2)
SODIUM SERPL-SCNC: 138 MMOL/L (ref 136–145)
SODIUM SERPL-SCNC: 140 MMOL/L (ref 136–145)
SP GR UR STRIP: 1.01 (ref 1–1.03)
TIBC SERPL-MCNC: 266 UG/DL (ref 250–450)
TRIGL SERPL-MCNC: 94 MG/DL (ref ?–150)
UA UROBILINOGEN AMB POC: NORMAL (ref 0.2–1)
URINALYSIS CLARITY POC: NORMAL
URINALYSIS COLOR POC: YELLOW
URINE BLOOD POC: NORMAL
URINE LEUKOCYTES POC: NORMAL
URINE NITRITES POC: POSITIVE
VLDLC SERPL CALC-MCNC: 18.8 MG/DL
WBC # BLD AUTO: 8.5 K/UL (ref 3.6–11)

## 2021-01-01 PROCEDURE — 36415 COLL VENOUS BLD VENIPUNCTURE: CPT | Performed by: FAMILY MEDICINE

## 2021-01-01 PROCEDURE — G0008 ADMIN INFLUENZA VIRUS VAC: HCPCS | Performed by: FAMILY MEDICINE

## 2021-01-01 PROCEDURE — 90694 VACC AIIV4 NO PRSRV 0.5ML IM: CPT | Performed by: FAMILY MEDICINE

## 2021-01-01 PROCEDURE — 99214 OFFICE O/P EST MOD 30 MIN: CPT | Performed by: FAMILY MEDICINE

## 2021-01-01 PROCEDURE — 81003 URINALYSIS AUTO W/O SCOPE: CPT | Performed by: FAMILY MEDICINE

## 2021-01-01 RX ORDER — BUMETANIDE 2 MG/1
1 TABLET ORAL DAILY
Qty: 30 TABLET | Refills: 2
Start: 2021-01-01 | End: 2021-01-01

## 2021-01-01 RX ORDER — CLOPIDOGREL BISULFATE 75 MG/1
TABLET ORAL
Qty: 90 TABLET | Refills: 3 | Status: SHIPPED | OUTPATIENT
Start: 2021-01-01

## 2021-01-01 RX ORDER — CEPHALEXIN 500 MG/1
1000 CAPSULE ORAL 3 TIMES DAILY
Qty: 18 CAPSULE | Refills: 0 | Status: SHIPPED | OUTPATIENT
Start: 2021-01-01 | End: 2021-01-01

## 2021-01-01 RX ORDER — PANTOPRAZOLE SODIUM 40 MG/1
40 TABLET, DELAYED RELEASE ORAL DAILY
Qty: 90 TABLET | Refills: 3 | Status: SHIPPED | OUTPATIENT
Start: 2021-01-01

## 2021-01-01 RX ORDER — LANOLIN ALCOHOL/MO/W.PET/CERES
325 CREAM (GRAM) TOPICAL DAILY
Qty: 90 TABLET | Refills: 3 | Status: SHIPPED | OUTPATIENT
Start: 2021-01-01

## 2021-01-01 RX ORDER — BUMETANIDE 1 MG/1
1 TABLET ORAL DAILY
Qty: 90 TABLET | Refills: 3 | Status: SHIPPED | OUTPATIENT
Start: 2021-01-01

## 2021-03-02 ENCOUNTER — TELEPHONE (OUTPATIENT)
Dept: FAMILY MEDICINE CLINIC | Age: 86
End: 2021-03-02

## 2021-03-02 ENCOUNTER — TRANSCRIBE ORDER (OUTPATIENT)
Dept: FAMILY MEDICINE CLINIC | Age: 86
End: 2021-03-02

## 2021-03-02 NOTE — TELEPHONE ENCOUNTER
----- Message from López Hammnodjoe sent at 3/2/2021  1:58 PM EST -----  Regarding: MD Ruiz/ refill  Medication Refill    Patient's first and last name:   Lucian Hernandez  :  3/5/1930    Caller (if not patient): Abril Plunkett     Relationship of caller (if not patient): Gildardo Red contact number(s):    [phone]    Name of medication and dosage if known: Warfin (COUMADIN) 3 mg tablet    Is patient out of this medication (yes/no): yes    Pharmacy name:  [place] listed in chart? (yes/no): yes    Pharmacy phone number: n/a    Details to clarify the request: Pt would like to request medication to pharmacy on file. Pt is currently out of medication.  Pt is having leg swelling

## 2021-03-02 NOTE — TELEPHONE ENCOUNTER
Patient called and spoke with son Sobia Alamo). He reports patient was taken off coumadin at the last ER visit and wants permission to restart her coumadin.  Without knowing any additional information patient scheduled for followup with Dr Tea Rowe on Friday 3/5

## 2021-03-05 PROBLEM — R60.9 PERIPHERAL EDEMA: Status: RESOLVED | Noted: 2019-05-30 | Resolved: 2021-03-05

## 2021-03-05 PROBLEM — E66.01 OBESITY, MORBID (HCC): Status: RESOLVED | Noted: 2020-11-20 | Resolved: 2021-03-05

## 2021-03-05 PROBLEM — I82.5Z2 CHRONIC DEEP VEIN THROMBOSIS (DVT) OF DISTAL VEIN OF LEFT LOWER EXTREMITY (HCC): Status: ACTIVE | Noted: 2019-07-01

## 2021-03-05 PROBLEM — R60.0 PEDAL EDEMA: Status: ACTIVE | Noted: 2021-03-05

## 2021-03-18 ENCOUNTER — TELEPHONE (OUTPATIENT)
Dept: FAMILY MEDICINE CLINIC | Age: 86
End: 2021-03-18

## 2021-03-18 NOTE — TELEPHONE ENCOUNTER
----- Message from Laly Leo sent at 3/18/2021  9:02 AM EDT -----  Regarding: Dr. Ellen Ramsey  General Message/Vendor Calls    Caller's first and last name: Marixa Ny      Reason for call:  To report pt was discharged from nursing on 3/17 but physical therapy is continuing       Callback required yes/no and why: yes, to confirm       Best contact number(s): [phone]      Details to clarify the request: n/a      Laly Leo

## 2021-08-13 NOTE — PROGRESS NOTES
Chief Complaint   Patient presents with    Hypertension     1. Have you been to the ER, urgent care clinic since your last visit? Hospitalized since your last visit? No    2. Have you seen or consulted any other health care providers outside of the [address] [place] [address] since your last visit? Include any pap smears or colon screening. No    Identified pt with two pt identifiers(name and ). Reviewed record in preparation for visit and have obtained necessary documentation.     Symptom review:    NO  Fever   NO  Shaking chills  NO  Cough  NO Headaches  NO  Body aches  NO  Coughing up blood  NO  Chest congestion  NO  Chest pain  NO  Shortness of breath  NO  Profound Loss of smell/taste  NO  Nausea/Vomiting   NO  Loose stool/Diarrhea  No  any skin issues

## 2021-08-13 NOTE — PROGRESS NOTES
Citlaly Black is a 80 y.o. female    HPI:  Hypertension and CHF  Blood pressures doing well since last visit. GFR improving. Since then, swelling has been better. Still feeling jonah of weak though. Recheck BMP last visit ok on bumex. Current regimen: ACE inhibitor, beta-blocker, calcium channel blocker, loop diuretic, and aldactone. Symptoms include moderate LE edema. Hasn't been wearing support stockings, states they hurt her calves. Patient denies palpitations. Lab review:   Cr (from [facility])  1.9 2/21  1.7 2/21  1.09 3/21    Lab Results   Component Value Date/Time    Sodium 142 05/01/2019 11:42 AM    Potassium 4.8 05/01/2019 11:42 AM    Chloride 104 05/01/2019 11:42 AM    CO2 25 05/01/2019 11:42 AM    Glucose 102 (H) 05/01/2019 11:42 AM    BUN 36 (H) 05/01/2019 11:42 AM    Creatinine 1.37 (H) 05/01/2019 11:42 AM    BUN/Creatinine ratio 26 05/01/2019 11:42 AM    GFR est AA 39 (L) 05/01/2019 11:42 AM    GFR est non-AA 34 (L) 05/01/2019 11:42 AM    Calcium 9.7 05/01/2019 11:42 AM     Atrial fibrillation, intermittent. Current symptoms: occ fluttering feelings  Current control agent: B-blocker (max dose coreg), and diltiazem. Current anticoagulant: ASA 81mg every day and plavix 75mg every day. History of bleeding problems: GI bleed 9/2015, with drop in HCT. None since. Lab Results   Component Value Date/Time    Hemoglobin (POC) 11.6 10/08/2018 11:35 AM    HGB 13.2 02/12/2019 12:38 PM     CHD  No bleed sx since 9/2015. Remains on just plavix, b-blocker, statin, and loop diuretic. Trino well. No myalgias, arthralgias, unusual weakness. Does have increased risk of recurrent GI bleed on double antiplt, and is on protonix for GI prophy. No sx lately.     Lab Results   Component Value Date/Time    Cholesterol, total 126 07/06/2018 10:52 AM    HDL Cholesterol 64 07/06/2018 10:52 AM    LDL, calculated 51 07/06/2018 10:52 AM    VLDL, calculated 11 07/06/2018 10:52 AM    Triglyceride 56 07/06/2018 10:52 AM 2/2/21: (care everywhere)  Alkaline Phosphatase 32 - 129 U/L 76        Aspartate Aminotransferase 0 - 31 IU/L 30    Comment: Slight Hemolysis has been detected on this sample.  Hemolysis may adversely   affect results of this test.       Alanine Aminotransferase 0 - 31 IU/L 13    Comment: Slight Hemolysis has been detected on this sample.  Hemolysis may adversely   affect results of this test.       Bilirubin Total 0.1 - 1.5 mg/dL 0.5    Comment: Slight Hemolysis has been detected on this sample.  Hemolysis may adversely        Knee pain  Pt reports doing well on APAP lately. Anemia  History of GI bleed Discontinued coumadin due to this Not on PPI Received transfusion in hospital and Hgb was 10 3.2021. Due for recheck       PMH, , Medications/Allergies: reviewed, on chart. PT has moved in with her son who lives in [place]. Current Outpatient Medications   Medication Sig    bumetanide (BUMEX) 2 mg tablet TAKE 1 TABLET BY MOUTH EVERY DAY.  clopidogreL (PLAVIX) 75 mg tab TAKE 1 TABLET BY MOUTH EVERY DAY    amLODIPine (NORVASC) 2.5 mg tablet take 1 tablet by mouth once daily for blood pressure    atorvastatin (LIPITOR) 40 mg tablet take 1 tablet by oral route  every day    spironolactone (ALDACTONE) 50 mg tablet Take 1 Tab by mouth daily. Indications: pressure and heart    carvediloL (COREG) 25 mg tablet TAKE 1 TABLET BY MOUTH TWICE A DAY for heart and pressure    Walker misc 1 Each by Does Not Apply route daily. Needs a byron walker due to low height    ferrous sulfate 325 mg (65 mg iron) tablet Take 1 Tab by mouth daily.  pantoprazole (PROTONIX) 40 mg tablet Take 1 Tab by mouth daily.  diclofenac (VOLTAREN) 1 % gel Apply  to affected area four (4) times daily.  nitroglycerin (NITROSTAT) 0.4 mg SL tablet 1 Tab by SubLINGual route every five (5) minutes as needed for Chest Pain for up to 3 doses. No current facility-administered medications for this visit.       ROS:  Constitutional: No fever, chills or abnormal weight loss  Respiratory: No cough, SOB   CV: No chest pain or Palpitations    Visit Vitals  /60   Pulse 88   Temp 97.2 °F (36.2 °C) (Temporal)   Resp 18   Ht 4' 7\" (1.397 m)   Wt 176 lb (79.8 kg)   SpO2 98%   BMI 40.91 kg/m²     -29#  Wt Readings from Last 3 Encounters:   08/13/21 176 lb (79.8 kg)   03/05/21 205 lb 3.2 oz (93.1 kg)   08/21/20 185 lb (83.9 kg)     BP Readings from Last 3 Encounters:   08/13/21 120/60   03/05/21 132/70   11/20/20 122/68     Physical Examination: General appearance - alert, well appearing, and in no distress  Mental status - alert, oriented to person, place, and time  Eyes - pupils equal and reactive, extraocular eye movements intact  ENT - bilateral external ears and nose normal. Normal lips  Neck - supple, no significant adenopathy, no thyromegaly or mass  Lymphatics - no palpable lymphadenopathy, no hepatosplenomegaly  Chest - clear to auscultation, no wheezes, rales or rhonchi, symmetric air entry  Heart - normal rate, regular rhythm, normal S1, S2, no murmurs, rubs, clicks or gallops  Extremities - peripheral pulses normal, no pedal edema, no clubbing or cyanosis    A/p:  HTN with CKD3 with MATHIEU, much improved. Doing well since last visti. Con't cautious use of diuretics. Check labs. Anemia, Chronic dz type vs blood loss. Much better on labs this week with . Iron was ok in hosp, but thay may have been due to concentration. Recheck today, supplement as needed. ASCVD and CHF, with pedal edema  BP good, will use support wraps to mobilize LE fluid and cautious use of diuretics. Next visit with cardiology due, consult ore. RF meds. Hx VTE  Probably not a candidate for long term [address] due to 2x severe episodes of bleeding in past 9mo while on warfarin, will con't on just plavix but not ASA. DJD  Avoid ASA, use APAP instead. Set up for PT at [facility]    F/U 3mo for recheck and labs.

## 2021-08-17 NOTE — PROGRESS NOTES
Patient scheduled with Dr Meghana Longoria with [facility] in [facility] on Sept 1 at 1145. Patients son Florina Martell) provided information regarding appointment.  Records faxed to [phone]

## 2021-08-18 NOTE — PROGRESS NOTES
Patient called spoke with patients enzo Sunil Lopez informed Mr Garber Marker per Dr Wes Mullins to cut bumetanide in half give one half pill daily 1mg.

## 2021-08-20 NOTE — PROGRESS NOTES
8/13/21  Spoke with patient verified name and date of birth patient put her son on the phone for results ,he understands new orders to cut bumetanide in half which is 1 mg.

## 2021-11-03 NOTE — TELEPHONE ENCOUNTER
Called and spoke with son Nelli. He states that she did not injure herself with the fall. He believes that she may have a UTI. She has urinary frequency. He stated this happened the last time she had a bladder infection. She became unbalanced and had frequency. I advised if he feels she cannot wait until Friday appt, he may take her to the urgent care or ER for treatment. He verbalized understanding and will keep her Friday appt for now.

## 2021-11-03 NOTE — TELEPHONE ENCOUNTER
----- Message from Roxanne Hall sent at 11/3/2021  7:40 AM EDT -----  Subject: Message to Provider    QUESTIONS  Information for Provider? Had a fall on Tuesday. Has appt Friday but would   like appt today. Please call Shiela Cook [phone]  ---------------------------------------------------------------------------  --------------  Amy CURRIE  What is the best way for the office to contact you? OK to leave message on   voicemail  Preferred Call Back Phone Number? [phone]  ---------------------------------------------------------------------------  --------------  SCRIPT ANSWERS  Relationship to Patient? Third Party  Representative Name?  Gildardo Aiken

## 2021-11-05 NOTE — PROGRESS NOTES
Gay Broderick is a 80 y.o. female    HPI:  Hypertension and CHF  Blood pressures doing well since last visit. GFR improving. Since then, swelling has been better. Still feeling jonah of weak though. Recheck BMP last visit ok on bumex. Current regimen: ACE inhibitor, beta-blocker, calcium channel blocker, loop diuretic, and aldactone. Symptoms include moderate LE edema. Hasn't been wearing support stockings, states they hurt her calves. Patient denies palpitations. Lab Results   Component Value Date/Time    Sodium 140 08/13/2021 10:45 AM    Potassium 4.3 08/13/2021 10:45 AM    Chloride 106 08/13/2021 10:45 AM    CO2 31 08/13/2021 10:45 AM    Anion gap 3 (L) 08/13/2021 10:45 AM    Glucose 85 08/13/2021 10:45 AM    BUN 48 (H) 08/13/2021 10:45 AM    Creatinine 1.69 (H) 08/13/2021 10:45 AM    BUN/Creatinine ratio 28 (H) 08/13/2021 10:45 AM    GFR est AA 34 (L) 08/13/2021 10:45 AM    GFR est non-AA 28 (L) 08/13/2021 10:45 AM    Calcium 9.4 08/13/2021 10:45 AM       Atrial fibrillation, intermittent. Current symptoms: occ fluttering feelings  Current control agent: B-blocker (max dose coreg), and diltiazem. Current anticoagulant: ASA 81mg every day and plavix 75mg every day. History of bleeding problems: GI bleed 9/2015, with drop in HCT. None since. Lab Results   Component Value Date/Time    Hemoglobin (POC) 11.6 10/08/2018 11:35 AM    HGB 13.2 02/12/2019 12:38 PM     CHD  No bleed sx since 9/2015. Remains on just plavix, b-blocker, statin, and loop diuretic. Trino well. No myalgias, arthralgias, unusual weakness. Does have increased risk of recurrent GI bleed on double antiplt, and is on protonix for GI prophy. No sx lately.     Lab Results   Component Value Date/Time    Cholesterol, total 126 07/06/2018 10:52 AM    HDL Cholesterol 64 07/06/2018 10:52 AM    LDL, calculated 51 07/06/2018 10:52 AM    VLDL, calculated 11 07/06/2018 10:52 AM    Triglyceride 56 07/06/2018 10:52 AM     2/2/21: (care everywhere)  Alkaline Phosphatase 32 - 129 U/L 76        Aspartate Aminotransferase 0 - 31 IU/L 30    Comment: Slight Hemolysis has been detected on this sample.  Hemolysis may adversely   affect results of this test.       Alanine Aminotransferase 0 - 31 IU/L 13    Comment: Slight Hemolysis has been detected on this sample.  Hemolysis may adversely   affect results of this test.       Bilirubin Total 0.1 - 1.5 mg/dL 0.5    Comment: Slight Hemolysis has been detected on this sample.  Hemolysis may adversely        Knee pain  Pt reports doing well on APAP lately. Anemia  History of GI bleed Discontinued coumadin due to this Not on PPI. Received transfusion in hospital and Hgb was 10 3.2021. Due for recheck spring  Lab Results   Component Value Date/Time    WBC 8.5 08/13/2021 10:45 AM    Hemoglobin (POC) 11.6 10/08/2018 11:35 AM    HGB 12.4 08/13/2021 10:45 AM    HCT (POC) 43.0 12/21/2015 03:01 PM    HCT 38.9 08/13/2021 10:45 AM    PLATELET 909 26/43/9397 10:45 AM    MCV 99.7 (H) 08/13/2021 10:45 AM         PMH, SH, Medications/Allergies: reviewed, on chart. PT has moved in with her son who lives in [place]. Current Outpatient Medications   Medication Sig    bumetanide (BUMEX) 2 mg tablet Take 0.5 Tablets by mouth daily.  ferrous sulfate 325 mg (65 mg iron) tablet Take 1 Tablet by mouth daily.  pantoprazole (PROTONIX) 40 mg tablet Take 1 Tablet by mouth daily.  clopidogreL (PLAVIX) 75 mg tab TAKE 1 TABLET BY MOUTH EVERY DAY to prevent heart attack and stroke    amLODIPine (NORVASC) 2.5 mg tablet take 1 tablet by mouth once daily for blood pressure    atorvastatin (LIPITOR) 40 mg tablet take 1 tablet by oral route  every day    spironolactone (ALDACTONE) 50 mg tablet Take 1 Tab by mouth daily. Indications: pressure and heart    carvediloL (COREG) 25 mg tablet TAKE 1 TABLET BY MOUTH TWICE A DAY for heart and pressure    Walker misc 1 Each by Does Not Apply route daily.  Needs a byron walker due to low height    diclofenac (VOLTAREN) 1 % gel Apply  to affected area four (4) times daily.  nitroglycerin (NITROSTAT) 0.4 mg SL tablet 1 Tab by SubLINGual route every five (5) minutes as needed for Chest Pain for up to 3 doses. No current facility-administered medications for this visit.       ROS:  Constitutional: No fever, chills or abnormal weight loss  Respiratory: No cough, SOB   CV: No chest pain or Palpitations    Visit Vitals  /80   Pulse 100   Temp 98 °F (36.7 °C) (Temporal)   Resp 22   Ht 4' 7\" (1.397 m)   Wt 176 lb (79.8 kg)   SpO2 (!) 78%   BMI 40.91 kg/m²     -29#  Wt Readings from Last 3 Encounters:   11/05/21 176 lb (79.8 kg)   08/13/21 176 lb (79.8 kg)   03/05/21 205 lb 3.2 oz (93.1 kg)     BP Readings from Last 3 Encounters:   11/05/21 120/80   08/13/21 120/60   03/05/21 132/70     Physical Examination: General appearance - alert, well appearing, and in no distress  Mental status - alert, oriented to person, place, and time  Eyes - pupils equal and reactive, extraocular eye movements intact  ENT - bilateral external ears and nose normal. Normal lips  Neck - supple, no significant adenopathy, no thyromegaly or mass  Lymphatics - no palpable lymphadenopathy, no hepatosplenomegaly  Chest - clear to auscultation, no wheezes, rales or rhonchi, symmetric air entry  Heart - normal rate, regular rhythm, normal S1, S2, no murmurs, rubs, clicks or gallops  Extremities - peripheral pulses normal, no pedal edema, no clubbing or cyanosis    Results for orders placed or performed in visit on 11/05/21   AMB POC URINALYSIS DIP STICK AUTO W/O MICRO   Result Value Ref Range    Color (UA POC) Yellow     Clarity (UA POC) Cloudy     Glucose (UA POC) Negative Negative    Bilirubin (UA POC) Negative Negative    Ketones (UA POC) Negative Negative    Specific gravity (UA POC) 1.010 1.001 - 1.035    Blood (UA POC) Trace Negative    pH (UA POC) 6.0 4.6 - 8.0    Protein (UA POC) 3+ Negative    Urobilinogen (UA POC) 0.2 mg/dL 0.2 - 1    Nitrites (UA POC) Positive Negative    Leukocyte esterase (UA POC) 1+ Negative       A/p:  UTI, acute, uncomplicated  Tx with     HTN with CKD3 with MATHIEU, doing well, due for recheck  Con't cautious use of diuretics. Check labs. Anemia, Chronic dz type vs blood loss. Much better on labs this week with HH. Iron was ok in hosp, but thay may have been due to concentration. Recheck today, supplement as needed. ASCVD and CHF, with pedal edema  BP good, rec using support wraps to mobilize LE fluid and cautious use of diuretics. Next visit with cardiology due, consult [facility]. RF meds. Hx VTE  Probably not a candidate for long term [address] due to 2x severe episodes of bleeding in past 9mo while on warfarin, will con't on just plavix but not ASA. DJD  Avoid ASA, use APAP instead. Set up referral for PT at [facility]     Flu shot today    F/U 3mo for recheck and labs.

## 2021-11-05 NOTE — PATIENT INSTRUCTIONS
Influenza (Flu) Vaccine (Inactivated or Recombinant): What You Need to Know Why get vaccinated? Influenza vaccine can prevent influenza (flu). Flu is a contagious disease that spreads around the [place] every year, usually between October and May. Anyone can get the flu, but it is more dangerous for some people. Infants and young children, people 72years of age and older, pregnant women, and people with certain health conditions or a weakened immune system are at greatest risk of flu complications. Pneumonia, bronchitis, sinus infections and ear infections are examples of flu-related complications. If you have a medical condition, such as heart disease, cancer or diabetes, flu can make it worse. Flu can cause fever and chills, sore throat, muscle aches, fatigue, cough, headache, and runny or stuffy nose. Some people may have vomiting and diarrhea, though this is more common in children than adults. Each year, thousands of people in the [facility] die from flu, and many more are hospitalized. Flu vaccine prevents millions of illnesses and flu-related visits to the doctor each year. Influenza vaccine CDC recommends everyone 10months of age and older get vaccinated every flu season. Children 6 months through 6years of age may need 2 doses during a single flu season. Everyone else needs only 1 dose each flu season. It takes about 2 weeks for protection to develop after vaccination. There are many flu viruses, and they are always changing. Each year a new flu vaccine is made to protect against three or four viruses that are likely to cause disease in the upcoming flu season. Even when the vaccine doesn't exactly match these viruses, it may still provide some protection. Influenza vaccine does not cause flu. Influenza vaccine may be given at the same time as other vaccines. Talk with your health care provider Tell your vaccine provider if the person getting the vaccine: 
· Has had an allergic reaction after a previous dose of influenza vaccine, or has any severe, life-threatening allergies. · Has ever had Guillain-Barré Syndrome (also called GBS). In some cases, your health care provider may decide to postpone influenza vaccination to a future visit. People with minor illnesses, such as a cold, may be vaccinated. People who are moderately or severely ill should usually wait until they recover before getting influenza vaccine. Your health care provider can give you more information. Risks of a vaccine reaction · Soreness, redness, and swelling where shot is given, fever, muscle aches, and headache can happen after influenza vaccine. · There may be a very small increased risk of Guillain-Barré Syndrome (GBS) after inactivated influenza vaccine (the flu shot). [place] children who get the flu shot along with pneumococcal vaccine (PCV13), and/or DTaP vaccine at the same time might be slightly more likely to have a seizure caused by fever. Tell your health care provider if a child who is getting flu vaccine has ever had a seizure. People sometimes faint after medical procedures, including vaccination. Tell your provider if you feel dizzy or have vision changes or ringing in the ears. As with any medicine, there is a very remote chance of a vaccine causing a severe allergic reaction, other serious injury, or death. What if there is a serious problem? An allergic reaction could occur after the vaccinated person leaves the clinic. If you see signs of a severe allergic reaction (hives, swelling of the face and throat, difficulty breathing, a fast heartbeat, dizziness, or weakness), call 9-1-1 and get the person to the nearest hospital. 
For other signs that concern you, call your health care provider. Adverse reactions should be reported to the Vaccine Adverse Event Reporting System (VAERS). Your health care provider will usually file this report, or you can do it yourself.  Visit the VAERS website at www.vaers. [facility].gov or call 1-451.361.7196. VAERS is only for reporting reactions, and VAERS staff do not give medical advice. The National Vaccine Injury Compensation Program 
The National Vaccine Injury Compensation Program (VICP) is a federal program that was created to compensate people who may have been injured by certain vaccines. Visit the VICP website at www.[facility].gov/vaccinecompensation or call 2-480.262.6971 to learn about the program and about filing a claim. There is a time limit to file a claim for compensation. How can I learn more? · Ask your healthcare provider. · Call your local or state health department. · Contact the Centers for Disease Control and Prevention (CDC): 
? Call 9-697.442.6707 (1-122-KYR-INFO) or 
? Visit CDC's website at www.cdc.gov/flu Vaccine Information Statement (Interim) Inactivated Influenza Vaccine 8/15/2019 
[address] 035II-04 Department of [facility] and [employer] Centers for Disease Control and Prevention Many Vaccine Information Statements are available in [place] and other languages. See www.immunize.org/vis. Muchas hojas de información sobre vacunas están disponibles en español y en otros idiomas. Visite www.immunize.org/vis. Care instructions adapted under license by [employer] (which disclaims liability or warranty for this information). If you have questions about a medical condition or this instruction, always ask your healthcare professional. [place] [address] any warranty or liability for your use of this information.

## 2021-11-05 NOTE — PROGRESS NOTES
Visit Vitals  /80   Pulse 100   Temp 98 °F (36.7 °C) (Temporal)   Resp 22   Ht 4' 7\" (1.397 m)   Wt 176 lb (79.8 kg)   SpO2 (!) 78%   BMI 40.91 kg/m²     Chief Complaint   Patient presents with    Fall     1. Have you been to the ER, urgent care clinic since your last visit? Hospitalized since your last visit? No    2. Have you seen or consulted any other health care providers outside of the [address] [place] [address] since your last visit? Include any pap smears or colon screening. No  After obtaining consent, and per orders of , injection of Fluad given by Harley Lacey. Patient instructed to remain in clinic for 20 minutes afterwards, and to report any adverse reaction to me immediately.

## 2022-01-01 ENCOUNTER — HOSPITAL ENCOUNTER (OUTPATIENT)
Dept: CT IMAGING | Age: 87
Discharge: HOME | End: 2022-06-28
Attending: SURGERY
Payer: MEDICARE

## 2022-01-01 ENCOUNTER — TELEPHONE (OUTPATIENT)
Dept: SURGERY | Age: 87
End: 2022-01-01

## 2022-01-01 ENCOUNTER — OFFICE VISIT (OUTPATIENT)
Dept: FAMILY MEDICINE CLINIC | Age: 87
End: 2022-01-01
Payer: MEDICARE

## 2022-01-01 ENCOUNTER — TELEPHONE (OUTPATIENT)
Dept: FAMILY MEDICINE CLINIC | Age: 87
End: 2022-01-01

## 2022-01-01 ENCOUNTER — HOSPITAL ENCOUNTER (OUTPATIENT)
Dept: ULTRASOUND IMAGING | Age: 87
Discharge: HOME | End: 2022-06-22
Attending: FAMILY MEDICINE
Payer: MEDICARE

## 2022-01-01 ENCOUNTER — APPOINTMENT (OUTPATIENT)
Dept: GENERAL RADIOLOGY | Age: 87
DRG: 683 | End: 2022-01-01
Attending: EMERGENCY MEDICINE
Payer: MEDICARE

## 2022-01-01 ENCOUNTER — HOSPITAL ENCOUNTER (OUTPATIENT)
Dept: MAMMOGRAPHY | Age: 87
Discharge: HOME | End: 2022-06-22
Attending: FAMILY MEDICINE
Payer: MEDICARE

## 2022-01-01 ENCOUNTER — HOSPITAL ENCOUNTER (OUTPATIENT)
Dept: NUCLEAR MEDICINE | Age: 87
Discharge: HOME | End: 2022-06-28
Attending: SURGERY
Payer: MEDICARE

## 2022-01-01 ENCOUNTER — APPOINTMENT (OUTPATIENT)
Dept: CT IMAGING | Age: 87
DRG: 683 | End: 2022-01-01
Attending: EMERGENCY MEDICINE
Payer: MEDICARE

## 2022-01-01 ENCOUNTER — HOSPITAL ENCOUNTER (OUTPATIENT)
Dept: LAB | Age: 87
Discharge: HOME | End: 2022-06-15

## 2022-01-01 ENCOUNTER — NURSE TRIAGE (OUTPATIENT)
Dept: OTHER | Facility: CLINIC | Age: 87
End: 2022-01-01

## 2022-01-01 ENCOUNTER — HOSPITAL ENCOUNTER (INPATIENT)
Age: 87
LOS: 2 days | Discharge: HOSPICE | DRG: 683 | End: 2022-06-30
Attending: EMERGENCY MEDICINE | Admitting: INTERNAL MEDICINE
Payer: MEDICARE

## 2022-01-01 ENCOUNTER — OFFICE VISIT (OUTPATIENT)
Dept: SURGERY | Age: 87
End: 2022-01-01
Payer: MEDICARE

## 2022-01-01 VITALS
OXYGEN SATURATION: 97 % | SYSTOLIC BLOOD PRESSURE: 100 MMHG | BODY MASS INDEX: 38.88 KG/M2 | WEIGHT: 168 LBS | DIASTOLIC BLOOD PRESSURE: 62 MMHG | HEART RATE: 68 BPM | HEIGHT: 55 IN | RESPIRATION RATE: 18 BRPM | TEMPERATURE: 97.3 F

## 2022-01-01 VITALS
WEIGHT: 175.2 LBS | HEIGHT: 56 IN | HEART RATE: 69 BPM | SYSTOLIC BLOOD PRESSURE: 110 MMHG | TEMPERATURE: 97.3 F | BODY MASS INDEX: 39.41 KG/M2 | RESPIRATION RATE: 20 BRPM | OXYGEN SATURATION: 97 % | DIASTOLIC BLOOD PRESSURE: 56 MMHG

## 2022-01-01 VITALS
OXYGEN SATURATION: 99 % | HEART RATE: 79 BPM | HEIGHT: 55 IN | DIASTOLIC BLOOD PRESSURE: 78 MMHG | TEMPERATURE: 97.3 F | RESPIRATION RATE: 18 BRPM | BODY MASS INDEX: 39.05 KG/M2 | SYSTOLIC BLOOD PRESSURE: 120 MMHG

## 2022-01-01 VITALS
BODY MASS INDEX: 38.46 KG/M2 | RESPIRATION RATE: 18 BRPM | TEMPERATURE: 98.1 F | HEIGHT: 55 IN | HEART RATE: 89 BPM | OXYGEN SATURATION: 97 % | WEIGHT: 166.2 LBS | DIASTOLIC BLOOD PRESSURE: 82 MMHG | SYSTOLIC BLOOD PRESSURE: 128 MMHG

## 2022-01-01 VITALS
HEIGHT: 55 IN | DIASTOLIC BLOOD PRESSURE: 62 MMHG | WEIGHT: 166 LBS | RESPIRATION RATE: 22 BRPM | HEART RATE: 62 BPM | TEMPERATURE: 96.4 F | BODY MASS INDEX: 38.42 KG/M2 | SYSTOLIC BLOOD PRESSURE: 122 MMHG | OXYGEN SATURATION: 97 %

## 2022-01-01 DIAGNOSIS — N63.13 MASS OF LOWER OUTER QUADRANT OF RIGHT BREAST: ICD-10-CM

## 2022-01-01 DIAGNOSIS — Z91.89 AT HIGH RISK FOR SKIN BREAKDOWN: ICD-10-CM

## 2022-01-01 DIAGNOSIS — R29.898 MUSCULAR DECONDITIONING: Primary | ICD-10-CM

## 2022-01-01 DIAGNOSIS — I25.10 CORONARY ARTERY DISEASE INVOLVING NATIVE CORONARY ARTERY OF NATIVE HEART WITHOUT ANGINA PECTORIS: ICD-10-CM

## 2022-01-01 DIAGNOSIS — R09.89 CHOKING EPISODE: ICD-10-CM

## 2022-01-01 DIAGNOSIS — N63.11 MASS OF UPPER OUTER QUADRANT OF RIGHT BREAST: Primary | ICD-10-CM

## 2022-01-01 DIAGNOSIS — Z13.31 SCREENING FOR DEPRESSION: ICD-10-CM

## 2022-01-01 DIAGNOSIS — C50.411 MALIGNANT NEOPLASM OF UPPER-OUTER QUADRANT OF RIGHT FEMALE BREAST, UNSPECIFIED ESTROGEN RECEPTOR STATUS (HCC): ICD-10-CM

## 2022-01-01 DIAGNOSIS — I10 PRIMARY HYPERTENSION: ICD-10-CM

## 2022-01-01 DIAGNOSIS — Z87.440 HISTORY OF UTI: Primary | ICD-10-CM

## 2022-01-01 DIAGNOSIS — M17.0 PRIMARY OSTEOARTHRITIS OF BOTH KNEES: ICD-10-CM

## 2022-01-01 DIAGNOSIS — N17.9 ACUTE RENAL FAILURE, UNSPECIFIED ACUTE RENAL FAILURE TYPE (HCC): Primary | ICD-10-CM

## 2022-01-01 DIAGNOSIS — N64.4 BREAST PAIN, RIGHT: ICD-10-CM

## 2022-01-01 DIAGNOSIS — N63.10 BREAST MASS, RIGHT: ICD-10-CM

## 2022-01-01 DIAGNOSIS — E66.01 SEVERE OBESITY (BMI 35.0-39.9) WITH COMORBIDITY (HCC): ICD-10-CM

## 2022-01-01 DIAGNOSIS — I48.0 PAROXYSMAL ATRIAL FIBRILLATION (HCC): ICD-10-CM

## 2022-01-01 DIAGNOSIS — Z00.00 MEDICARE ANNUAL WELLNESS VISIT, SUBSEQUENT: Primary | ICD-10-CM

## 2022-01-01 DIAGNOSIS — R77.8 ELEVATED TROPONIN: ICD-10-CM

## 2022-01-01 DIAGNOSIS — C50.411 MALIGNANT NEOPLASM OF UPPER-OUTER QUADRANT OF RIGHT FEMALE BREAST, UNSPECIFIED ESTROGEN RECEPTOR STATUS (HCC): Primary | ICD-10-CM

## 2022-01-01 DIAGNOSIS — Z13.39 SCREENING FOR ALCOHOLISM: ICD-10-CM

## 2022-01-01 DIAGNOSIS — N63.13 MASS OF LOWER OUTER QUADRANT OF RIGHT BREAST: Primary | ICD-10-CM

## 2022-01-01 DIAGNOSIS — R59.0 AXILLARY LYMPHADENOPATHY: ICD-10-CM

## 2022-01-01 LAB
ALBUMIN SERPL-MCNC: 2 G/DL (ref 3.5–5)
ALBUMIN SERPL-MCNC: 2.7 G/DL (ref 3.5–5)
ALBUMIN/GLOB SERPL: 0.5 {RATIO} (ref 1.1–2.2)
ALBUMIN/GLOB SERPL: 0.5 {RATIO} (ref 1.1–2.2)
ALP SERPL-CCNC: 178 U/L (ref 45–117)
ALP SERPL-CCNC: 239 U/L (ref 45–117)
ALT SERPL-CCNC: 51 U/L (ref 12–78)
ALT SERPL-CCNC: 64 U/L (ref 12–78)
ANION GAP SERPL CALC-SCNC: 10 MMOL/L (ref 5–15)
ANION GAP SERPL CALC-SCNC: 11 MMOL/L (ref 5–15)
ANION GAP SERPL CALC-SCNC: 11 MMOL/L (ref 5–15)
ANION GAP SERPL CALC-SCNC: 12 MMOL/L (ref 5–15)
ANION GAP SERPL CALC-SCNC: 12 MMOL/L (ref 5–15)
APPEARANCE UR: CLEAR
AST SERPL-CCNC: 125 U/L (ref 15–37)
AST SERPL-CCNC: 156 U/L (ref 15–37)
ATRIAL RATE: 73 BPM
ATRIAL RATE: 73 BPM
BACTERIA SPEC CULT: NORMAL
BACTERIA SPEC CULT: NORMAL
BACTERIA URNS QL MICRO: NEGATIVE /HPF
BASOPHILS # BLD: 0 K/UL (ref 0–0.1)
BASOPHILS # BLD: 0 K/UL (ref 0–0.1)
BASOPHILS NFR BLD: 0 % (ref 0–1)
BASOPHILS NFR BLD: 0 % (ref 0–1)
BILIRUB SERPL-MCNC: 1 MG/DL (ref 0.2–1)
BILIRUB SERPL-MCNC: 1 MG/DL (ref 0.2–1)
BILIRUB UR QL STRIP: NEGATIVE
BILIRUB UR QL: NEGATIVE
BUN SERPL-MCNC: 106 MG/DL (ref 6–20)
BUN SERPL-MCNC: 127 MG/DL (ref 6–20)
BUN SERPL-MCNC: 129 MG/DL (ref 6–20)
BUN SERPL-MCNC: 132 MG/DL (ref 6–20)
BUN SERPL-MCNC: 77 MG/DL (ref 6–20)
BUN SERPL-MCNC: 98 MG/DL (ref 6–20)
BUN/CREAT SERPL: 33 (ref 12–20)
BUN/CREAT SERPL: 34 (ref 12–20)
BUN/CREAT SERPL: 36 (ref 12–20)
BUN/CREAT SERPL: 37 (ref 12–20)
BUN/CREAT SERPL: 37 (ref 12–20)
CALCIUM SERPL-MCNC: 8.4 MG/DL (ref 8.5–10.1)
CALCIUM SERPL-MCNC: 8.4 MG/DL (ref 8.5–10.1)
CALCIUM SERPL-MCNC: 8.6 MG/DL (ref 8.5–10.1)
CALCIUM SERPL-MCNC: 9.2 MG/DL (ref 8.5–10.1)
CALCIUM SERPL-MCNC: 9.7 MG/DL (ref 8.5–10.1)
CALCULATED P AXIS, ECG09: 72 DEGREES
CALCULATED P AXIS, ECG09: 89 DEGREES
CALCULATED R AXIS, ECG10: -46 DEGREES
CALCULATED R AXIS, ECG10: -46 DEGREES
CALCULATED T AXIS, ECG11: 26 DEGREES
CALCULATED T AXIS, ECG11: 40 DEGREES
CHLORIDE SERPL-SCNC: 105 MMOL/L (ref 97–108)
CHLORIDE SERPL-SCNC: 108 MMOL/L (ref 97–108)
CHLORIDE SERPL-SCNC: 110 MMOL/L (ref 97–108)
CHLORIDE SERPL-SCNC: 95 MMOL/L (ref 97–108)
CHLORIDE SERPL-SCNC: 98 MMOL/L (ref 97–108)
CO2 SERPL-SCNC: 18 MMOL/L (ref 21–32)
CO2 SERPL-SCNC: 19 MMOL/L (ref 21–32)
CO2 SERPL-SCNC: 21 MMOL/L (ref 21–32)
CO2 SERPL-SCNC: 24 MMOL/L (ref 21–32)
CO2 SERPL-SCNC: 24 MMOL/L (ref 21–32)
COLOR UR: NORMAL
CREAT SERPL-MCNC: 2.31 MG/DL (ref 0.55–1.02)
CREAT SERPL-MCNC: 2.64 MG/DL (ref 0.55–1.02)
CREAT SERPL-MCNC: 2.85 MG/DL (ref 0.55–1.02)
CREAT SERPL-MCNC: 3.52 MG/DL (ref 0.55–1.02)
CREAT SERPL-MCNC: 3.57 MG/DL (ref 0.55–1.02)
CREAT SERPL-MCNC: 3.83 MG/DL (ref 0.55–1.02)
DIAGNOSIS, 93000: NORMAL
DIAGNOSIS, 93000: NORMAL
DIFFERENTIAL METHOD BLD: ABNORMAL
DIFFERENTIAL METHOD BLD: ABNORMAL
EOSINOPHIL # BLD: 0 K/UL (ref 0–0.4)
EOSINOPHIL # BLD: 0 K/UL (ref 0–0.4)
EOSINOPHIL NFR BLD: 0 % (ref 0–7)
EOSINOPHIL NFR BLD: 0 % (ref 0–7)
EPITH CASTS URNS QL MICRO: NORMAL /LPF
ERYTHROCYTE [DISTWIDTH] IN BLOOD BY AUTOMATED COUNT: 13.7 % (ref 11.5–14.5)
ERYTHROCYTE [DISTWIDTH] IN BLOOD BY AUTOMATED COUNT: 14.1 % (ref 11.5–14.5)
GLOBULIN SER CALC-MCNC: 4.2 G/DL (ref 2–4)
GLOBULIN SER CALC-MCNC: 5.4 G/DL (ref 2–4)
GLUCOSE BLD STRIP.AUTO-MCNC: 104 MG/DL (ref 65–117)
GLUCOSE SERPL-MCNC: 105 MG/DL (ref 65–100)
GLUCOSE SERPL-MCNC: 111 MG/DL (ref 65–100)
GLUCOSE SERPL-MCNC: 119 MG/DL (ref 65–100)
GLUCOSE SERPL-MCNC: 120 MG/DL (ref 65–100)
GLUCOSE SERPL-MCNC: 71 MG/DL (ref 65–100)
GLUCOSE UR STRIP.AUTO-MCNC: NEGATIVE MG/DL
GLUCOSE UR-MCNC: NEGATIVE MG/DL
HCT VFR BLD AUTO: 33.7 % (ref 35–47)
HCT VFR BLD AUTO: 40.7 % (ref 35–47)
HGB BLD-MCNC: 11.1 G/DL (ref 11.5–16)
HGB BLD-MCNC: 13.8 G/DL (ref 11.5–16)
HGB UR QL STRIP: NEGATIVE
IMM GRANULOCYTES # BLD AUTO: 0 K/UL (ref 0–0.04)
IMM GRANULOCYTES # BLD AUTO: 0.1 K/UL (ref 0–0.04)
IMM GRANULOCYTES NFR BLD AUTO: 0 % (ref 0–0.5)
IMM GRANULOCYTES NFR BLD AUTO: 0 % (ref 0–0.5)
KETONES P FAST UR STRIP-MCNC: NEGATIVE MG/DL
KETONES UR QL STRIP.AUTO: NEGATIVE MG/DL
LACTATE SERPL-SCNC: 1.3 MMOL/L (ref 0.4–2)
LACTATE SERPL-SCNC: 2.5 MMOL/L (ref 0.4–2)
LEUKOCYTE ESTERASE UR QL STRIP.AUTO: NEGATIVE
LYMPHOCYTES # BLD: 2.2 K/UL (ref 0.8–3.5)
LYMPHOCYTES # BLD: 3 K/UL (ref 0.8–3.5)
LYMPHOCYTES NFR BLD: 19 % (ref 12–49)
LYMPHOCYTES NFR BLD: 26 % (ref 12–49)
MAGNESIUM SERPL-MCNC: 1.7 MG/DL (ref 1.6–2.4)
MAGNESIUM SERPL-MCNC: 1.7 MG/DL (ref 1.6–2.4)
MAGNESIUM SERPL-MCNC: 2.1 MG/DL (ref 1.6–2.4)
MCH RBC QN AUTO: 30.3 PG (ref 26–34)
MCH RBC QN AUTO: 31 PG (ref 26–34)
MCHC RBC AUTO-ENTMCNC: 32.9 G/DL (ref 30–36.5)
MCHC RBC AUTO-ENTMCNC: 33.9 G/DL (ref 30–36.5)
MCV RBC AUTO: 91.5 FL (ref 80–99)
MCV RBC AUTO: 92.1 FL (ref 80–99)
MONOCYTES # BLD: 0.7 K/UL (ref 0–1)
MONOCYTES # BLD: 0.7 K/UL (ref 0–1)
MONOCYTES NFR BLD: 6 % (ref 5–13)
MONOCYTES NFR BLD: 6 % (ref 5–13)
NEUTS SEG # BLD: 8 K/UL (ref 1.8–8)
NEUTS SEG # BLD: 8.6 K/UL (ref 1.8–8)
NEUTS SEG NFR BLD: 68 % (ref 32–75)
NEUTS SEG NFR BLD: 74 % (ref 32–75)
NITRITE UR QL STRIP.AUTO: NEGATIVE
NRBC # BLD: 0.06 K/UL (ref 0–0.01)
NRBC # BLD: 0.12 K/UL (ref 0–0.01)
NRBC BLD-RTO: 0.5 PER 100 WBC
NRBC BLD-RTO: 1 PER 100 WBC
P-R INTERVAL, ECG05: 154 MS
P-R INTERVAL, ECG05: 164 MS
PH UR STRIP: 6 [PH] (ref 4.6–8)
PH UR STRIP: 6 [PH] (ref 5–8)
PHOSPHATE SERPL-MCNC: 4.5 MG/DL (ref 2.6–4.7)
PLATELET # BLD AUTO: 144 K/UL (ref 150–400)
PLATELET # BLD AUTO: 167 K/UL (ref 150–400)
PMV BLD AUTO: 11.7 FL (ref 8.9–12.9)
PMV BLD AUTO: 12.2 FL (ref 8.9–12.9)
POTASSIUM SERPL-SCNC: 4.5 MMOL/L (ref 3.5–5.1)
POTASSIUM SERPL-SCNC: 4.6 MMOL/L (ref 3.5–5.1)
POTASSIUM SERPL-SCNC: 4.8 MMOL/L (ref 3.5–5.1)
POTASSIUM SERPL-SCNC: 5.2 MMOL/L (ref 3.5–5.1)
POTASSIUM SERPL-SCNC: 5.6 MMOL/L (ref 3.5–5.1)
PROT SERPL-MCNC: 6.2 G/DL (ref 6.4–8.2)
PROT SERPL-MCNC: 8.1 G/DL (ref 6.4–8.2)
PROT UR QL STRIP: NEGATIVE
PROT UR STRIP-MCNC: NEGATIVE MG/DL
Q-T INTERVAL, ECG07: 408 MS
Q-T INTERVAL, ECG07: 440 MS
QRS DURATION, ECG06: 84 MS
QRS DURATION, ECG06: 88 MS
QTC CALCULATION (BEZET), ECG08: 449 MS
QTC CALCULATION (BEZET), ECG08: 484 MS
RBC # BLD AUTO: 3.66 M/UL (ref 3.8–5.2)
RBC # BLD AUTO: 4.45 M/UL (ref 3.8–5.2)
RBC #/AREA URNS HPF: NORMAL /HPF (ref 0–5)
SERVICE CMNT-IMP: NORMAL
SODIUM SERPL-SCNC: 130 MMOL/L (ref 136–145)
SODIUM SERPL-SCNC: 132 MMOL/L (ref 136–145)
SODIUM SERPL-SCNC: 137 MMOL/L (ref 136–145)
SODIUM SERPL-SCNC: 139 MMOL/L (ref 136–145)
SODIUM SERPL-SCNC: 140 MMOL/L (ref 136–145)
SP GR UR REFRACTOMETRY: 1.01 (ref 1–1.03)
SP GR UR STRIP: 1.01 (ref 1–1.03)
TROPONIN-HIGH SENSITIVITY: 159 NG/L (ref 0–51)
TROPONIN-HIGH SENSITIVITY: 162 NG/L (ref 0–51)
TROPONIN-HIGH SENSITIVITY: 170 NG/L (ref 0–51)
TROPONIN-HIGH SENSITIVITY: 182 NG/L (ref 0–51)
UA UROBILINOGEN AMB POC: NORMAL (ref 0.2–1)
UA: UC IF INDICATED,UAUC: NORMAL
URINALYSIS CLARITY POC: CLEAR
URINALYSIS COLOR POC: YELLOW
URINE BLOOD POC: NEGATIVE
URINE LEUKOCYTES POC: NEGATIVE
URINE NITRITES POC: NEGATIVE
UROBILINOGEN UR QL STRIP.AUTO: 0.2 EU/DL (ref 0.2–1)
VENTRICULAR RATE, ECG03: 73 BPM
VENTRICULAR RATE, ECG03: 73 BPM
WBC # BLD AUTO: 11.6 K/UL (ref 3.6–11)
WBC # BLD AUTO: 11.9 K/UL (ref 3.6–11)
WBC URNS QL MICRO: NORMAL /HPF (ref 0–4)

## 2022-01-01 PROCEDURE — 83735 ASSAY OF MAGNESIUM: CPT

## 2022-01-01 PROCEDURE — G8432 DEP SCR NOT DOC, RNG: HCPCS | Performed by: SURGERY

## 2022-01-01 PROCEDURE — G8417 CALC BMI ABV UP PARAM F/U: HCPCS | Performed by: SURGERY

## 2022-01-01 PROCEDURE — 76642 ULTRASOUND BREAST LIMITED: CPT

## 2022-01-01 PROCEDURE — 93005 ELECTROCARDIOGRAM TRACING: CPT

## 2022-01-01 PROCEDURE — 96360 HYDRATION IV INFUSION INIT: CPT

## 2022-01-01 PROCEDURE — 87040 BLOOD CULTURE FOR BACTERIA: CPT

## 2022-01-01 PROCEDURE — 74011250637 HC RX REV CODE- 250/637: Performed by: INTERNAL MEDICINE

## 2022-01-01 PROCEDURE — 92610 EVALUATE SWALLOWING FUNCTION: CPT

## 2022-01-01 PROCEDURE — 84484 ASSAY OF TROPONIN QUANT: CPT

## 2022-01-01 PROCEDURE — 80048 BASIC METABOLIC PNL TOTAL CA: CPT

## 2022-01-01 PROCEDURE — 99204 OFFICE O/P NEW MOD 45 MIN: CPT | Performed by: SURGERY

## 2022-01-01 PROCEDURE — 80053 COMPREHEN METABOLIC PANEL: CPT

## 2022-01-01 PROCEDURE — 65270000046 HC RM TELEMETRY

## 2022-01-01 PROCEDURE — 74011250637 HC RX REV CODE- 250/637: Performed by: EMERGENCY MEDICINE

## 2022-01-01 PROCEDURE — 36415 COLL VENOUS BLD VENIPUNCTURE: CPT

## 2022-01-01 PROCEDURE — 70450 CT HEAD/BRAIN W/O DYE: CPT

## 2022-01-01 PROCEDURE — 99214 OFFICE O/P EST MOD 30 MIN: CPT | Performed by: NURSE PRACTITIONER

## 2022-01-01 PROCEDURE — 81001 URINALYSIS AUTO W/SCOPE: CPT

## 2022-01-01 PROCEDURE — 78306 BONE IMAGING WHOLE BODY: CPT

## 2022-01-01 PROCEDURE — 85025 COMPLETE CBC W/AUTO DIFF WBC: CPT

## 2022-01-01 PROCEDURE — 82962 GLUCOSE BLOOD TEST: CPT

## 2022-01-01 PROCEDURE — G0439 PPPS, SUBSEQ VISIT: HCPCS | Performed by: FAMILY MEDICINE

## 2022-01-01 PROCEDURE — 1090F PRES/ABSN URINE INCON ASSESS: CPT | Performed by: SURGERY

## 2022-01-01 PROCEDURE — 76641 ULTRASOUND BREAST COMPLETE: CPT | Performed by: SURGERY

## 2022-01-01 PROCEDURE — G8417 CALC BMI ABV UP PARAM F/U: HCPCS | Performed by: FAMILY MEDICINE

## 2022-01-01 PROCEDURE — G8510 SCR DEP NEG, NO PLAN REQD: HCPCS | Performed by: FAMILY MEDICINE

## 2022-01-01 PROCEDURE — 76942 ECHO GUIDE FOR BIOPSY: CPT | Performed by: SURGERY

## 2022-01-01 PROCEDURE — 1123F ACP DISCUSS/DSCN MKR DOCD: CPT | Performed by: FAMILY MEDICINE

## 2022-01-01 PROCEDURE — 84100 ASSAY OF PHOSPHORUS: CPT

## 2022-01-01 PROCEDURE — 1123F ACP DISCUSS/DSCN MKR DOCD: CPT | Performed by: SURGERY

## 2022-01-01 PROCEDURE — G0444 DEPRESSION SCREEN ANNUAL: HCPCS | Performed by: FAMILY MEDICINE

## 2022-01-01 PROCEDURE — 74011250636 HC RX REV CODE- 250/636: Performed by: INTERNAL MEDICINE

## 2022-01-01 PROCEDURE — 74176 CT ABD & PELVIS W/O CONTRAST: CPT

## 2022-01-01 PROCEDURE — G8536 NO DOC ELDER MAL SCRN: HCPCS | Performed by: SURGERY

## 2022-01-01 PROCEDURE — 99214 OFFICE O/P EST MOD 30 MIN: CPT | Performed by: FAMILY MEDICINE

## 2022-01-01 PROCEDURE — G8427 DOCREV CUR MEDS BY ELIG CLIN: HCPCS | Performed by: SURGERY

## 2022-01-01 PROCEDURE — 1101F PT FALLS ASSESS-DOCD LE1/YR: CPT | Performed by: FAMILY MEDICINE

## 2022-01-01 PROCEDURE — 74011250636 HC RX REV CODE- 250/636: Performed by: EMERGENCY MEDICINE

## 2022-01-01 PROCEDURE — 99285 EMERGENCY DEPT VISIT HI MDM: CPT

## 2022-01-01 PROCEDURE — 1101F PT FALLS ASSESS-DOCD LE1/YR: CPT | Performed by: SURGERY

## 2022-01-01 PROCEDURE — 81003 URINALYSIS AUTO W/O SCOPE: CPT | Performed by: NURSE PRACTITIONER

## 2022-01-01 PROCEDURE — 19083 BX BREAST 1ST LESION US IMAG: CPT | Performed by: SURGERY

## 2022-01-01 PROCEDURE — G8427 DOCREV CUR MEDS BY ELIG CLIN: HCPCS | Performed by: FAMILY MEDICINE

## 2022-01-01 PROCEDURE — 77066 DX MAMMO INCL CAD BI: CPT

## 2022-01-01 PROCEDURE — 74011000250 HC RX REV CODE- 250: Performed by: SURGERY

## 2022-01-01 PROCEDURE — 71045 X-RAY EXAM CHEST 1 VIEW: CPT

## 2022-01-01 PROCEDURE — 96361 HYDRATE IV INFUSION ADD-ON: CPT

## 2022-01-01 PROCEDURE — 38505 NEEDLE BIOPSY LYMPH NODES: CPT | Performed by: SURGERY

## 2022-01-01 PROCEDURE — G8536 NO DOC ELDER MAL SCRN: HCPCS | Performed by: FAMILY MEDICINE

## 2022-01-01 PROCEDURE — 84520 ASSAY OF UREA NITROGEN: CPT

## 2022-01-01 PROCEDURE — 83605 ASSAY OF LACTIC ACID: CPT

## 2022-01-01 RX ORDER — CLOPIDOGREL BISULFATE 75 MG/1
75 TABLET ORAL DAILY
Status: DISCONTINUED | OUTPATIENT
Start: 2022-01-01 | End: 2022-01-01 | Stop reason: HOSPADM

## 2022-01-01 RX ORDER — PANTOPRAZOLE SODIUM 40 MG/1
40 TABLET, DELAYED RELEASE ORAL DAILY
Status: DISCONTINUED | OUTPATIENT
Start: 2022-01-01 | End: 2022-01-01 | Stop reason: HOSPADM

## 2022-01-01 RX ORDER — AMLODIPINE BESYLATE 2.5 MG/1
2.5 TABLET ORAL DAILY
Status: DISCONTINUED | OUTPATIENT
Start: 2022-01-01 | End: 2022-01-01 | Stop reason: HOSPADM

## 2022-01-01 RX ORDER — FACIAL-BODY WIPES
10 EACH TOPICAL DAILY PRN
Status: DISCONTINUED | OUTPATIENT
Start: 2022-01-01 | End: 2022-01-01 | Stop reason: HOSPADM

## 2022-01-01 RX ORDER — TRAMADOL HYDROCHLORIDE 50 MG/1
50 TABLET ORAL
Status: DISCONTINUED | OUTPATIENT
Start: 2022-01-01 | End: 2022-01-01 | Stop reason: HOSPADM

## 2022-01-01 RX ORDER — TRAMADOL HYDROCHLORIDE 50 MG/1
50 TABLET ORAL
Qty: 45 TABLET | Refills: 0 | Status: SHIPPED | OUTPATIENT
Start: 2022-01-01 | End: 2022-01-01

## 2022-01-01 RX ORDER — CEPHALEXIN 500 MG/1
500 CAPSULE ORAL 2 TIMES DAILY
COMMUNITY
Start: 2022-01-01 | End: 2022-01-01

## 2022-01-01 RX ORDER — DICLOFENAC SODIUM 10 MG/G
2 GEL TOPICAL 4 TIMES DAILY
Status: DISCONTINUED | OUTPATIENT
Start: 2022-01-01 | End: 2022-01-01 | Stop reason: HOSPADM

## 2022-01-01 RX ORDER — SODIUM CHLORIDE 9 MG/ML
100 INJECTION, SOLUTION INTRAVENOUS CONTINUOUS
Status: DISCONTINUED | OUTPATIENT
Start: 2022-01-01 | End: 2022-01-01 | Stop reason: HOSPADM

## 2022-01-01 RX ORDER — FERROUS SULFATE 324(65)MG
325 TABLET, DELAYED RELEASE (ENTERIC COATED) ORAL DAILY
Status: DISCONTINUED | OUTPATIENT
Start: 2022-01-01 | End: 2022-01-01 | Stop reason: HOSPADM

## 2022-01-01 RX ORDER — DOXYCYCLINE 100 MG/1
100 CAPSULE ORAL 2 TIMES DAILY
COMMUNITY
Start: 2022-01-01 | End: 2022-01-01

## 2022-01-01 RX ORDER — BARIUM SULFATE 20 MG/ML
450 SUSPENSION ORAL
Status: COMPLETED | OUTPATIENT
Start: 2022-01-01 | End: 2022-01-01

## 2022-01-01 RX ORDER — LIDOCAINE HYDROCHLORIDE 10 MG/ML
10 INJECTION, SOLUTION EPIDURAL; INFILTRATION; INTRACAUDAL; PERINEURAL ONCE
Qty: 20 ML | Refills: 0
Start: 2022-01-01 | End: 2022-01-01

## 2022-01-01 RX ORDER — CARVEDILOL 25 MG/1
25 TABLET ORAL 2 TIMES DAILY WITH MEALS
Status: DISCONTINUED | OUTPATIENT
Start: 2022-01-01 | End: 2022-01-01 | Stop reason: HOSPADM

## 2022-01-01 RX ORDER — ATORVASTATIN CALCIUM 40 MG/1
40 TABLET, FILM COATED ORAL DAILY
Status: DISCONTINUED | OUTPATIENT
Start: 2022-01-01 | End: 2022-01-01 | Stop reason: HOSPADM

## 2022-01-01 RX ORDER — TRAMADOL HYDROCHLORIDE 50 MG/1
50 TABLET ORAL
Status: COMPLETED | OUTPATIENT
Start: 2022-01-01 | End: 2022-01-01

## 2022-01-01 RX ORDER — SODIUM CHLORIDE 0.9 % (FLUSH) 0.9 %
5-10 SYRINGE (ML) INJECTION AS NEEDED
Status: DISCONTINUED | OUTPATIENT
Start: 2022-01-01 | End: 2022-01-01 | Stop reason: HOSPADM

## 2022-01-01 RX ADMIN — SODIUM CHLORIDE 250 ML/HR: 9 INJECTION, SOLUTION INTRAVENOUS at 03:12

## 2022-01-01 RX ADMIN — ATORVASTATIN CALCIUM 40 MG: 40 TABLET, FILM COATED ORAL at 08:50

## 2022-01-01 RX ADMIN — AMLODIPINE BESYLATE 2.5 MG: 2.5 TABLET ORAL at 08:50

## 2022-01-01 RX ADMIN — SODIUM CHLORIDE 250 ML/HR: 9 INJECTION, SOLUTION INTRAVENOUS at 15:31

## 2022-01-01 RX ADMIN — PANTOPRAZOLE SODIUM 40 MG: 40 TABLET, DELAYED RELEASE ORAL at 11:31

## 2022-01-01 RX ADMIN — SODIUM CHLORIDE 100 ML/HR: 9 INJECTION, SOLUTION INTRAVENOUS at 05:00

## 2022-01-01 RX ADMIN — BARIUM SULFATE 450 ML: 20 SUSPENSION ORAL at 10:45

## 2022-01-01 RX ADMIN — PANTOPRAZOLE SODIUM 40 MG: 40 TABLET, DELAYED RELEASE ORAL at 07:12

## 2022-01-01 RX ADMIN — CARVEDILOL 25 MG: 25 TABLET, FILM COATED ORAL at 11:31

## 2022-01-01 RX ADMIN — FERROUS SULFATE TAB EC 324 MG (65 MG FE EQUIVALENT) 325 MG: 324 (65 FE) TABLET DELAYED RESPONSE at 08:50

## 2022-01-01 RX ADMIN — ATORVASTATIN CALCIUM 40 MG: 40 TABLET, FILM COATED ORAL at 11:31

## 2022-01-01 RX ADMIN — CARVEDILOL 25 MG: 25 TABLET, FILM COATED ORAL at 08:49

## 2022-01-01 RX ADMIN — SODIUM CHLORIDE 250 ML/HR: 9 INJECTION, SOLUTION INTRAVENOUS at 23:08

## 2022-01-01 RX ADMIN — AMLODIPINE BESYLATE 2.5 MG: 2.5 TABLET ORAL at 11:31

## 2022-01-01 RX ADMIN — SODIUM CHLORIDE 250 ML/HR: 9 INJECTION, SOLUTION INTRAVENOUS at 16:08

## 2022-01-01 RX ADMIN — CLOPIDOGREL BISULFATE 75 MG: 75 TABLET ORAL at 08:49

## 2022-01-01 RX ADMIN — FERROUS SULFATE TAB EC 324 MG (65 MG FE EQUIVALENT) 325 MG: 324 (65 FE) TABLET DELAYED RESPONSE at 11:31

## 2022-01-01 RX ADMIN — CLOPIDOGREL BISULFATE 75 MG: 75 TABLET ORAL at 11:31

## 2022-01-01 RX ADMIN — BISACODYL 10 MG: 10 SUPPOSITORY RECTAL at 18:48

## 2022-01-01 RX ADMIN — SODIUM CHLORIDE 250 ML/HR: 9 INJECTION, SOLUTION INTRAVENOUS at 06:44

## 2022-01-01 RX ADMIN — CARVEDILOL 25 MG: 25 TABLET, FILM COATED ORAL at 16:50

## 2022-01-01 RX ADMIN — TRAMADOL HYDROCHLORIDE 50 MG: 50 TABLET, COATED ORAL at 20:26

## 2022-03-18 PROBLEM — Z87.19 HISTORY OF GI BLEED: Status: ACTIVE | Noted: 2019-05-01

## 2022-03-18 PROBLEM — Z71.89 ADVANCE DIRECTIVE DISCUSSED WITH PATIENT: Status: ACTIVE | Noted: 2017-01-25

## 2022-03-19 PROBLEM — I82.5Z2 CHRONIC DEEP VEIN THROMBOSIS (DVT) OF DISTAL VEIN OF LEFT LOWER EXTREMITY (HCC): Status: ACTIVE | Noted: 2019-07-01

## 2022-03-19 PROBLEM — R60.0 PEDAL EDEMA: Status: ACTIVE | Noted: 2021-03-05

## 2022-05-06 NOTE — PROGRESS NOTES
Chief Complaint   Patient presents with    Knee Pain     left knee pain, hx of fall pt states\" hurts every once in awhile since. \"    Shoulder Pain    Eyelid Inflammation     Caregiver wants to know why eyelids are pink. HPI:      Alex Walker is a 80 y.o. female. Dr. Kady Morales is her PCP. She presents with her son and daughter-in-law today. She  has a past medical history of Diabetes, Hyperlipemia, Hypertension, Left leg DVT (07/2019), and Obesity. New Issues:  She has several complaints today. Her left shoulder and left knee have been aching intermittently. She reports that this has been bothersome since a fall several years ago. She was seen in the [address] [place] [address] ER for the fall. Pain comes and goes. Sometimes goes away with rest.  Other times she requires Tylenol. She reports her son will give her one per day if needed. She sits a lot in her wheelchair or chair. Her family is worried about her risk of skin breakdown. No Known Allergies    Current Outpatient Medications   Medication Sig    amLODIPine (NORVASC) 2.5 mg tablet TAKE 1 TABLET BY MOUTH EVERY DAY FOR BLOOD PRESSURE    atorvastatin (LIPITOR) 40 mg tablet TAKE 1 TABLET BY MOUTH EVERY DAY    bumetanide (BUMEX) 1 mg tablet Take 1 Tablet by mouth daily. Indications: fluid/ edema    ferrous sulfate 325 mg (65 mg iron) tablet Take 1 Tablet by mouth daily.  pantoprazole (PROTONIX) 40 mg tablet Take 1 Tablet by mouth daily.  clopidogreL (PLAVIX) 75 mg tab TAKE 1 TABLET BY MOUTH EVERY DAY to prevent heart attack and stroke    spironolactone (ALDACTONE) 50 mg tablet Take 1 Tab by mouth daily. Indications: pressure and heart    carvediloL (COREG) 25 mg tablet TAKE 1 TABLET BY MOUTH TWICE A DAY for heart and pressure    Walker misc 1 Each by Does Not Apply route daily. Needs a byron walker due to low height    diclofenac (VOLTAREN) 1 % gel Apply  to affected area four (4) times daily.     nitroglycerin (NITROSTAT) 0.4 mg SL tablet 1 Tab by SubLINGual route every five (5) minutes as needed for Chest Pain for up to 3 doses. No current facility-administered medications for this visit. Past Medical History:   Diagnosis Date    Anticoagulation goal of INR 1.5 to 2.5     Diabetes ([facility] [address]     Hyperlipemia     Hypertension     Left leg DVT ([facility] [address] 2019    Current DVT in same leg.  Obesity        Past Surgical History:   Procedure Laterality Date    HX APPENDECTOMY      HX HYSTERECTOMY         Social History     Socioeconomic History    Marital status:     Highest education level: 11th grade   Tobacco Use    Smoking status: Former Smoker     Packs/day: 0.50     Years: 10.00     Pack years: 5.00     Types: Cigarettes     Quit date: 1967     Years since quittin.9    Smokeless tobacco: Never Used   Vaping Use    Vaping Use: Never used   Substance and Sexual Activity    Alcohol use: No     Alcohol/week: 0.0 standard drinks    Drug use: No     Types: Prescription    Sexual activity: Not Currently   Other Topics Concern     Service No    Blood Transfusions Yes    Caffeine Concern No    Occupational Exposure No    Hobby Hazards No    Sleep Concern Yes    Stress Concern No    Weight Concern Yes    Special Diet No    Back Care No    Exercise No    Bike Helmet No    Seat Belt Yes    Self-Exams Yes       Family History   Problem Relation Age of Onset    Hypertension Mother     Preeclampsia Mother     Panic disorder Mother     Heart Disease Neg Hx     Cancer Neg Hx        Above history reviewed. ROS:  Denies fever, chills, cough, chest pain, SOB,  nausea, vomiting, or diarrhea. Denies wt loss, wt gain, hemoptysis, hematochezia or melena.     Physical Examination:    /82 (BP 1 Location: Right arm, BP Patient Position: Sitting, BP Cuff Size: Adult long)   Pulse 89   Temp 98.1 °F (36.7 °C) (Temporal)   Resp 18   Ht 4' 7\" (1.397 m)   Wt 166 lb 3.2 oz (75.4 kg)   SpO2 97%   BMI 38.63 kg/m²     General: Alert and Ox3, Fluent speech, [place]  HEENT:  PERRLA, EOM intact, TMs, turbinates, pharynx normal.  No thyromegaly. No cervical adenopathy. Neck:  Supple, no adenopathy, JVD, mass or bruit  Chest:  Clear to Ausculation, without wheezes, rales, rubs or ronchi  Cardiac: RRR  Extremities:  No cyanosis or clubbing. BLE with non-pitting edema   Neurologic: In wheelchair, CN 2-12 grossly intact. Moves all extremities. Skin: no rash  Lymphadenopathy: no cervical or supraclavicular nodes    Results for orders placed or performed in visit on 05/06/22   AMB POC URINALYSIS DIP STICK AUTO W/O MICRO   Result Value Ref Range    Color (UA POC) Yellow     Clarity (UA POC) Clear     Glucose (UA POC) Negative Negative    Bilirubin (UA POC) Negative Negative    Ketones (UA POC) Negative Negative    Specific gravity (UA POC) 1.015 1.001 - 1.035    Blood (UA POC) Negative Negative    pH (UA POC) 6.0 4.6 - 8.0    Protein (UA POC) Negative Negative    Urobilinogen (UA POC) 0.2 mg/dL 0.2 - 1    Nitrites (UA POC) Negative Negative    Leukocyte esterase (UA POC) Negative Negative      ASSESSMENT AND PLAN:     1. History of UTI  Urine test normal today   - AMB POC URINALYSIS DIP STICK AUTO W/O MICRO    2. Primary osteoarthritis of both knees  Recommend Tylenol  Topicals PRN    3. Primary hypertension  Good control  Continue current regimen     4.  At high risk for skin breakdown  Barrier cream  Pressure reduction strategies discussed      RTC PRN    Jamaal Day NP

## 2022-05-06 NOTE — PROGRESS NOTES
Sheri Rojo is a 80 y.o. female presenting for/with:    Chief Complaint   Patient presents with    Knee Pain     left knee pain, hx of fall pt states\" hurts every once in awhile since. \"    Shoulder Pain    Eyelid Inflammation     Caregiver wants to know why eyelids are pink. Visit Vitals  /82 (BP 1 Location: Right arm, BP Patient Position: Sitting, BP Cuff Size: Adult long)   Pulse 89   Temp 98.1 °F (36.7 °C) (Temporal)   Resp 18   Ht 4' 7\" (1.397 m)   Wt 166 lb 3.2 oz (75.4 kg)   SpO2 97%   BMI 38.63 kg/m²     Pain Scale: 6/10  Pain Location: Knee (left)      3 most recent PHQ Screens 5/6/2022   PHQ Not Done -   Little interest or pleasure in doing things Several days   Feeling down, depressed, irritable, or hopeless Several days   Total Score PHQ 2 2     Learning Assessment 5/6/2022   PRIMARY LEARNER Patient   PRIMARY LANGUAGE ENGLISH   LEARNER PREFERENCE PRIMARY READING     -   ANSWERED BY pt   RELATIONSHIP SELF     Fall Risk Assessment, last 12 mths 5/6/2022   Able to walk? Yes   Fall in past 12 months? 1   Do you feel unsteady? 1   Are you worried about falling 0   Is TUG test greater than 12 seconds? 0   Is the gait abnormal? 0   Number of falls in past 12 months 1   Fall with injury? 0     Abuse Screening Questionnaire 5/6/2022   Do you ever feel afraid of your partner? N   Are you in a relationship with someone who physically or mentally threatens you? N   Is it safe for you to go home?  Y     ADL Assessment 5/6/2022   Feeding yourself Help Needed   Getting from bed to chair Help Needed   Getting dressed Help Needed   Bathing or showering Help Needed   Walk across the room (includes cane/walker) Help Needed   Using the telphone Help Needed   Taking your medications Help Needed   Preparing meals Help Needed   Managing money (expenses/bills) Help Needed   Moderately strenuous housework (laundry) Help Needed   Shopping for personal items (toiletries/medicines) Help Needed   Shopping for groceries Help Needed   Driving Help Needed   Climbing a flight of stairs Help Needed   Getting to places beyond walking distances Help Needed       1. \"Have you been to the ER, urgent care clinic since your last visit? Hospitalized since your last visit? \" No    2. \"Have you seen or consulted any other health care providers outside of the [address] [place] [address] [place] since your last visit? \" No     3. For patients aged 39-70: Has the patient had a colonoscopy / FIT/ Cologuard? No      If the patient is female:    4. For patients aged 41-77: Has the patient had a mammogram within the past 2 years? No      5. For patients aged 21-65: Has the patient had a pap smear? No          Symptom review:    NO  Fever   NO  Shaking chills  NO  Cough  NO  Body aches  NO  Coughing up blood  NO  Chest congestion  NO  Chest pain  NO  Shortness of breath  NO  Profound Loss of smell/taste  NO  Nausea/Vomiting   NO  Loose stool/Diarrhea  NO  any skin issues    Patient Risk Factors Reviewed as follows:  NO  have you been in Close contact with confirmed COVID19 patient   NO  History of recent travel to affected geographical areas within the past 14 days  NO  COPD  NO  Active Cancer/Leukemia/Lymphoma/Chemotherapy  NO  Oral steroid use  NO  Pregnant  NO  Diabetes Mellitus  NO  Heart disease  NO  Asthma  NO Health care worker at home  NO Health care worker  NO Is there a Pregnant Woman in the home  NO Dialysis pt in the home   NO a large number of people living in the home  Recent Travel Screening and Travel History documentation    Recent Travel Screening and Travel History documentation     Travel Screening     Question   Response    In the last 10 days, have you been in contact with someone who was confirmed or suspected to have Coronavirus/COVID-19? No / Unsure    Have you had a COVID-19 viral test in the last 10 days? No    Do you have any of the following new or worsening symptoms?   None of these    Have you traveled internationally or domestically in the last month?   No      Travel History   Travel since 04/06/22    No documented travel since 04/06/22                   Advance Care Planning 8/13/2021   Patient's Healthcare Decision Maker is: Legal Next of Kin   Confirm Advance Directive None   Patient Would Like to Complete Advance Directive -      Results for orders placed or performed in visit on 05/06/22   AMB POC URINALYSIS DIP STICK AUTO W/O MICRO   Result Value Ref Range    Color (UA POC) Yellow     Clarity (UA POC) Clear     Glucose (UA POC) Negative Negative    Bilirubin (UA POC) Negative Negative    Ketones (UA POC) Negative Negative    Specific gravity (UA POC) 1.015 1.001 - 1.035    Blood (UA POC) Negative Negative    pH (UA POC) 6.0 4.6 - 8.0    Protein (UA POC) Negative Negative    Urobilinogen (UA POC) 0.2 mg/dL 0.2 - 1    Nitrites (UA POC) Negative Negative    Leukocyte esterase (UA POC) Negative Negative

## 2022-06-14 NOTE — TELEPHONE ENCOUNTER
Patient being referred by Dr Ruth Bañuelos for Mass of lower outer quadrant of right breast. He wants the patient to be seen ASAP. I was thinking of add him on tomorrow? What do you prefer?

## 2022-06-14 NOTE — TELEPHONE ENCOUNTER
Patient ref by  [facility] needing to be seen asap. Can Dr. Ace Wing see them sooner, emergency high priority patient.

## 2022-06-14 NOTE — TELEPHONE ENCOUNTER
Received a message that pt was having a breast issue. Would like to get her in for a follow up visit. Presented to [facility] ED with red, swollen, discolored L breast mass. No discomfort. No d/c. Tx with IV abx in ED, and ore for add'l course PO by ER. Suspected inflammatory breat cancer or a necrotic superficial tumor. Will get her in for an exam today, and depending on findings and pt preference, plan set pt up for urgent 3d mammogram and CT chest. May need PET scan for staging, will see. Family member/ caregiver gave understanding.

## 2022-06-14 NOTE — PROGRESS NOTES
Sheri Rojo is a 80 y.o. female presenting for/with:    Chief Complaint   Patient presents with    Breast Mass       Visit Vitals  /62   Pulse 62   Temp (!) 96.4 °F (35.8 °C) (Temporal)   Resp 22   Ht 4' 7\" (1.397 m)   Wt 166 lb (75.3 kg)   SpO2 97%   BMI 38.58 kg/m²     Pain Scale: /10  Pain Location:     1. \"Have you been to the ER, urgent care clinic since your last visit? Hospitalized since your last visit? \" Yes Reason for visit: [facility] ER    2. \"Have you seen or consulted any other health care providers outside of the [address] [place] [address] since your last visit? \" No     3. For patients aged 39-70: Has the patient had a colonoscopy / FIT/ Cologuard? NA - based on age      If the patient is female:    4. For patients aged 41-77: Has the patient had a mammogram within the past 2 years? No      5. For patients aged 21-65: Has the patient had a pap smear? NA - based on age or sex      Symptom review:  NO  Fever   NO  Shaking chills  NO  Cough  NO  Body aches  NO  Coughing up blood  NO  Chest congestion  NO  Chest pain  NO  Shortness of breath  NO  Profound Loss of smell/taste  NO  Nausea/Vomiting   NO  Loose stool/Diarrhea  NO  any skin issues    Patient Risk Factors Reviewed as follows:  NO  have you been in Close contact with confirmed COVID19 patient   NO  History of recent travel to affected geographical areas within the past 14 days  NO  COPD  NO  Active Cancer/Leukemia/Lymphoma/Chemotherapy  NO  Oral steroid use  NO  Pregnant  NO  Diabetes Mellitus  NO  Heart disease  NO  Asthma  NO Health care worker at home  NO Health care worker  NO Is there a Pregnant Woman in the home  NO Dialysis pt in the home   NO a large number of people living in the home    Learning Assessment 5/6/2022   PRIMARY LEARNER Patient   PRIMARY LANGUAGE ENGLISH   LEARNER PREFERENCE PRIMARY READING     -   ANSWERED BY pt   RELATIONSHIP SELF     Fall Risk Assessment, last 12 mths 6/14/2022   Able to walk?  Yes   Fall in past 12 months? 0   Do you feel unsteady? 0   Are you worried about falling 0   Is TUG test greater than 12 seconds? -   Is the gait abnormal? -   Number of falls in past 12 months -   Fall with injury? -       3 most recent PHQ Screens 6/14/2022   PHQ Not Done -   Little interest or pleasure in doing things Several days   Feeling down, depressed, irritable, or hopeless Several days   Total Score PHQ 2 2     Abuse Screening Questionnaire 6/14/2022   Do you ever feel afraid of your partner? N   Are you in a relationship with someone who physically or mentally threatens you? N   Is it safe for you to go home?  Y       ADL Assessment 6/14/2022   Feeding yourself No Help Needed   Getting from bed to chair Help Needed   Getting dressed Help Needed   Bathing or showering Help Needed   Walk across the room (includes cane/walker) Help Needed   Using the telphone No Help Needed   Taking your medications Help Needed   Preparing meals Help Needed   Managing money (expenses/bills) Help Needed   Moderately strenuous housework (laundry) Help Needed   Shopping for personal items (toiletries/medicines) Help Needed   Shopping for groceries Help Needed   Driving Help Needed   Climbing a flight of stairs Help Needed   Getting to places beyond walking distances Help Needed      Advance Care Planning 6/14/2022   Patient's Healthcare Decision Maker is: Legal Next of Kin   Confirm Advance Directive None   Patient Would Like to Complete Advance Directive No

## 2022-06-14 NOTE — PROGRESS NOTES
Lizbeth Mejia is a 80 y.o. female    HPI:  Symptoms include R breast mass. PT became aware of it about 2 weeks ago. Brought it to her son/caregiver's attention, who promptly took her to the ER concerned it was an infection. Seen at [facility] ER. Started on abx. Also had some intertrigo dx, tx with zinc oxide ointment. Hasn't picked those Rx's up yet. PMH, SH, Medications/Allergies: reviewed, on chart. Current Outpatient Medications   Medication Sig    amLODIPine (NORVASC) 2.5 mg tablet TAKE 1 TABLET BY MOUTH EVERY DAY FOR BLOOD PRESSURE    atorvastatin (LIPITOR) 40 mg tablet TAKE 1 TABLET BY MOUTH EVERY DAY    bumetanide (BUMEX) 1 mg tablet Take 1 Tablet by mouth daily. Indications: fluid/ edema    ferrous sulfate 325 mg (65 mg iron) tablet Take 1 Tablet by mouth daily.  pantoprazole (PROTONIX) 40 mg tablet Take 1 Tablet by mouth daily.  clopidogreL (PLAVIX) 75 mg tab TAKE 1 TABLET BY MOUTH EVERY DAY to prevent heart attack and stroke    spironolactone (ALDACTONE) 50 mg tablet Take 1 Tab by mouth daily. Indications: pressure and heart    carvediloL (COREG) 25 mg tablet TAKE 1 TABLET BY MOUTH TWICE A DAY for heart and pressure    Walker misc 1 Each by Does Not Apply route daily. Needs a byron walker due to low height    diclofenac (VOLTAREN) 1 % gel Apply  to affected area four (4) times daily.  nitroglycerin (NITROSTAT) 0.4 mg SL tablet 1 Tab by SubLINGual route every five (5) minutes as needed for Chest Pain for up to 3 doses. No current facility-administered medications for this visit.       ROS:  Constitutional: No fever, chills or abnormal weight loss  Respiratory: No cough, SOB   CV: No chest pain or Palpitations    Visit Vitals  /62   Pulse 62   Temp (!) 96.4 °F (35.8 °C) (Temporal)   Resp 22   Ht 4' 7\" (1.397 m)   Wt 166 lb (75.3 kg)   SpO2 97%   BMI 38.58 kg/m²     Wt Readings from Last 3 Encounters:   06/14/22 166 lb (75.3 kg)   05/06/22 166 lb 3.2 oz (75.4 kg) 11/05/21 176 lb (79.8 kg)     BP Readings from Last 3 Encounters:   06/14/22 122/62   05/06/22 128/82   11/05/21 120/80       Physical Examination: General appearance - alert, well appearing elderly woman in no distress  Mental status - alert, oriented to person, place, and time  Eyes - pupils equal and reactive, extraocular eye movements intact  ENT - bilateral external ears and nose normal. Normal lips  Neck - supple, no significant adenopathy, no thyromegaly or mass  Chest - clear to auscultation, no wheezes, rales or rhonchi, symmetric air entry  Heart - normal rate, regular rhythm, normal S1, S2, no murmurs, rubs, clicks or gallops  Breasts: left breast normal without mass, skin or nipple changes or axillary nodes, right lower outer quadrant abnormal mass palpable with 7- 8cm fungating mass with mild discharge. No palpable axillary or supraclavicular LAD R side. Extremities - peripheral pulses normal, no pedal edema, no clubbing or cyanosis. No inguinal LAD appreciated bilat. 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 Will start treatment planning. Get 3d dx mammo, set up with breast surgeon for urgent consult, set up with oncology at [facility], and discuss cardiac clearance with ITZEL Lew at [facility]. F/U 1mo or per specialists and studies.

## 2022-06-14 NOTE — Clinical Note
PT sees you and ITZEL Lew for her cardiology care at the [address] [place] [address]. She was well controlled at 3/2022 visit. She has a new onset/dx large R breast mass suspicious for fungating breast cancer. Will need cardiac clearance prior to surgery just dx today. Can you guys get her in for that in the next couple weeks? I would appreciate it so much.  Cruz

## 2022-06-15 PROBLEM — R59.0 AXILLARY LYMPHADENOPATHY: Status: ACTIVE | Noted: 2022-01-01

## 2022-06-15 PROBLEM — N63.11 MASS OF UPPER OUTER QUADRANT OF RIGHT BREAST: Status: ACTIVE | Noted: 2022-01-01

## 2022-06-15 PROBLEM — R59.0 AXILLARY LYMPHADENOPATHY: Status: ACTIVE | Noted: 2022-06-15

## 2022-06-15 PROBLEM — N63.11 MASS OF UPPER OUTER QUADRANT OF RIGHT BREAST: Status: ACTIVE | Noted: 2022-06-15

## 2022-06-15 NOTE — PROGRESS NOTES
Procedure Note    Pre Procedure Diagnosis:  Right breast mass 1000 and axillary lymphadenopathy  Post Procedure Diagnosis:  Right breast mass 1000 and axillary lymphadenopathy  Procedure:  1. Ultrasound guided spring loaded core biopsy of right axillary lymph node                      Surgeon:   Lizz Case MD FACS  Local 10 ml 1% lidocaine with epi  EBL minimal  SPECIMEN:   right breast mass    Procedure:  After informed consent and time out the right axilla was prepped with chlorhexidine. Using ultrasound guidance, local anesthesia was injected into the dermis and subcutaneous tissues adjacent to the irregular nodes noted on ultrasound. A small stab incision was made and using an 14 gauge Achieve spring loaded core biopsy device and ultrasound guidance 4 cores were taken from a lateral to medial approach. Pictures were taken to document this procedure. Pressure was held and then steristrips and a sterile dressing was applied. The patient tolerated the procedure well.       Signed  Lizz Case MD FACS

## 2022-06-15 NOTE — PROGRESS NOTES
Procedure Note    Pre Procedure Diagnosis:  Right breast mass 1000  Post Procedure Diagnosis:  Right breast mass 1000  Procedure:  1. Ultrasound guided vacuum assisted core biopsy of right breast mass 1000                      2.  Ultrasound guided marker/clip placement right breast  Surgeon:   Thom Mcgraw MD FACS  Local 10 ml 1% lidocaine with epi  EBL minimal  SPECIMEN:   right breast mass    Procedure:  After informed consent and time out the right breast was prepped with chlorhexidine. Using ultrasound guidance, local anesthesia was injected into the dermis and subcutaneous tissues adjacent to the irregular mass noted on ultrasound. A small stab incision was made and using an 8 gauge Mammotome vacuum assisted core biopsy device and ultrasound guidance 4 cores were taken from a lateral to medial approach. Pictures were taken to document this procedure. Next, again using ultrasound guidance a HydroMark 8 clip was placed in the biopsy cavity. Pressure was held and then steristrips and a sterile dressing was applied. The patient tolerated the procedure well.       Signed  Thom Mcgraw MD FACS

## 2022-06-15 NOTE — TELEPHONE ENCOUNTER
----- Message from Raghu Reyes sent at 6/15/2022 10:35 AM EDT -----  Subject: Message to Provider    QUESTIONS  Information for Provider? Aurelia from Dr. Clarinda Dakins 's office would like   to know if this patient has any pathology on her right breast. She has an   appointment today at 1 pm. Their phone number is [phone]  ---------------------------------------------------------------------------  --------------  [address]  What is the best way for the office to contact you? Do not leave any   message, patient will call back for answer  Preferred Call Back Phone Number?  [phone]  ---------------------------------------------------------------------------  --------------  SCRIPT ANSWERS  undefined

## 2022-06-15 NOTE — PROGRESS NOTES
HISTORY OF PRESENT ILLNESS  Devan Crowley is a 80 y.o. female who comes in for consultation by Caitlin Jarquin MD for a right breast mass  HPI  She noted a right breast lump \"2 weeks ago\". She lives with her son and he thinks that she has been hiding it for a lot longer. She has not had a mammogram in decades. She denies trauma but reports skin change with drainage and a mass in the UOQ of the right breast.   She denies prior breast issues. She had menarche at 15, first of two pregnancies at 25, menopause via hysterectomy and did not take HRT. She denies family hx breast cancer. She denies bone pain, fever, chills or sweats. Past Medical History:   Diagnosis Date    Anticoagulation goal of INR 1.5 to 2.5     Diabetes ([address]     Hyperlipemia     Hypertension     Left leg DVT ([address] 2019    Current DVT in same leg.  Obesity      Past Surgical History:   Procedure Laterality Date    HX APPENDECTOMY      HX HYSTERECTOMY       Family History   Problem Relation Age of Onset    Hypertension Mother     Preeclampsia Mother     Panic disorder Mother     Heart Disease Neg Hx     Cancer Neg Hx      Social History     Tobacco Use    Smoking status: Former Smoker     Packs/day: 0.50     Years: 10.00     Pack years: 5.00     Types: Cigarettes     Quit date: 1967     Years since quittin.1    Smokeless tobacco: Never Used   Vaping Use    Vaping Use: Never used   Substance Use Topics    Alcohol use: No     Alcohol/week: 0.0 standard drinks    Drug use: No     Types: Prescription     Family History   Problem Relation Age of Onset    Hypertension Mother     Preeclampsia Mother     Panic disorder Mother     Heart Disease Neg Hx     Cancer Neg Hx        Current Outpatient Medications   Medication Sig    doxycycline (VIBRAMYCIN) 100 mg capsule Take 100 mg by mouth two (2) times a day.  cephALEXin (KEFLEX) 500 mg capsule Take 500 mg by mouth two (2) times a day.     amLODIPine (NORVASC) 2.5 mg tablet TAKE 1 TABLET BY MOUTH EVERY DAY FOR BLOOD PRESSURE    atorvastatin (LIPITOR) 40 mg tablet TAKE 1 TABLET BY MOUTH EVERY DAY    bumetanide (BUMEX) 1 mg tablet Take 1 Tablet by mouth daily. Indications: fluid/ edema    ferrous sulfate 325 mg (65 mg iron) tablet Take 1 Tablet by mouth daily.  pantoprazole (PROTONIX) 40 mg tablet Take 1 Tablet by mouth daily.  clopidogreL (PLAVIX) 75 mg tab TAKE 1 TABLET BY MOUTH EVERY DAY to prevent heart attack and stroke    spironolactone (ALDACTONE) 50 mg tablet Take 1 Tab by mouth daily. Indications: pressure and heart    carvediloL (COREG) 25 mg tablet TAKE 1 TABLET BY MOUTH TWICE A DAY for heart and pressure    Walker misc 1 Each by Does Not Apply route daily. Needs a byron walker due to low height    diclofenac (VOLTAREN) 1 % gel Apply  to affected area four (4) times daily.  nitroglycerin (NITROSTAT) 0.4 mg SL tablet 1 Tab by SubLINGual route every five (5) minutes as needed for Chest Pain for up to 3 doses. No current facility-administered medications for this visit. No Known Allergies    Review of Systems   Constitutional: Negative for chills, diaphoresis, fever and weight loss. HENT: Positive for nosebleeds. Negative for sore throat. Eyes: Negative for blurred vision and discharge. Respiratory: Negative for cough, shortness of breath and wheezing. Cardiovascular: Negative for chest pain, palpitations, orthopnea, claudication and leg swelling. Gastrointestinal: Positive for constipation and diarrhea. Negative for abdominal pain, heartburn, melena, nausea and vomiting. Genitourinary: Negative for dysuria, flank pain, frequency and hematuria. Musculoskeletal: Negative for back pain, joint pain, myalgias and neck pain. Skin: Negative for rash. Neurological: Positive for weakness (uses wheelchair). Negative for dizziness, speech change, focal weakness, seizures, loss of consciousness and headaches. Endo/Heme/Allergies: Does not bruise/bleed easily. Psychiatric/Behavioral: Negative for depression and memory loss. Visit Vitals  /62 (BP 1 Location: Left upper arm, BP Patient Position: Sitting, BP Cuff Size: Adult)   Pulse 68   Temp 97.3 °F (36.3 °C) (Temporal)   Resp 18   Ht 4' 7\" (1.397 m)   Wt 76.2 kg (168 lb)   SpO2 97%   BMI 39.05 kg/m²       Physical Exam  Exam conducted with a chaperone present. Constitutional:       General: She is not in acute distress. Appearance: She is well-developed. She is not diaphoretic. HENT:      Head: Normocephalic and atraumatic. Mouth/Throat:      Pharynx: No oropharyngeal exudate. Eyes:      General: No scleral icterus. Conjunctiva/sclera: Conjunctivae normal.      Pupils: Pupils are equal, round, and reactive to light. Neck:      Thyroid: No thyromegaly. Vascular: No JVD. Trachea: No tracheal deviation. Cardiovascular:      Rate and Rhythm: Normal rate and regular rhythm. Heart sounds: No murmur heard. No friction rub. No gallop. Pulmonary:      Effort: Pulmonary effort is normal. No respiratory distress. Breath sounds: Normal breath sounds. No wheezing or rales. Chest:   Breasts:      Breasts are asymmetrical.      Right: Mass (7 cm ulcerated mass in UOQ), skin change, tenderness and axillary adenopathy present. No inverted nipple, nipple discharge or supraclavicular adenopathy. Left: No inverted nipple, mass, nipple discharge, skin change, tenderness, axillary adenopathy or supraclavicular adenopathy. Abdominal:      General: Bowel sounds are normal. There is no distension. Palpations: Abdomen is soft. There is no mass. Tenderness: There is no abdominal tenderness. There is no guarding or rebound. Musculoskeletal:         General: Normal range of motion. Cervical back: Normal range of motion and neck supple. Lymphadenopathy:      Cervical: No cervical adenopathy.       Upper Body: Right upper body: Axillary adenopathy present. No supraclavicular adenopathy. Left upper body: No supraclavicular or axillary adenopathy. Skin:     General: Skin is warm and dry. Coloration: Skin is not pale. Findings: No erythema or rash. Neurological:      Mental Status: She is alert and oriented to person, place, and time. Cranial Nerves: No cranial nerve deficit. Psychiatric:         Behavior: Behavior normal.         Thought Content: Thought content normal.         Judgment: Judgment normal.         ASSESSMENT and PLAN  1. Ulcerated right breast mass with palpable adenopathy. I definitely feel that this is malignancy. It has likely been present but neglected for quite some time. I explained the anatomy and pathophysiology of breast masses to her and her son. I explained to them the need to obtain a biopsy of the breast mass and an axillary node to determine options for treatment. 2.  Essential hypertension. Stable on rx  3. Atrial fibrillation. On clopidogrel  4. Chronic kidney disease stage 3-4.     5.  Chronic illness and weakness.      At this point they wished to proceed with US and biopsy of the mass and node today despite higher risk for bleeding from the clopidogrel  Will call with the biopsy results to discuss next steps      Dayne Bhatt MD FACS

## 2022-06-15 NOTE — PROGRESS NOTES
Ultrasound of the right breast and axilla    Procedure:  Ultrasound of right breast  Indication:  Ulcerated right breast mass at at 1000  Surgeon:  Dayne Bhatt MD FACS  Procedure:  Utilizing a [address] [place] [address] with high frequency linear array transducer the right  breast was scanned and images obtained with special attention to the 10 o'clock position and axilla  Findings:  Hypoechoic irregular mass at 1000 minimum size 3.3 x 2.9 x 4.9 cm and multiple rounded axillar nodes measuring 7-12 mm   Impression:  Highly suspicious for malignancy and suspicious lymph nodes BIRADS: 5  Recommend:  Biopsy for histology    Dayne Bhatt MD FACS

## 2022-06-15 NOTE — PROGRESS NOTES
[facility] [address] [facility]  OFFICE PROCEDURE PROGRESS NOTE        Chart reviewed for the following:   INick, have reviewed the History, Physical and updated the Allergic reactions for Faina Quinones performed immediately prior to start of procedure:   Nick HUBER, have performed the following reviews on Lotus Sicard prior to the start of the procedure:            * Patient was identified by name and date of birth   * Agreement on procedure being performed was verified  * Risks and Benefits explained to the patient  * Procedure site verified and marked as necessary  * Patient was positioned for comfort  * Consent was signed and verified     Time: 2151      Date of procedure: 6/15/2022    Procedure performed by:  Sharda Butcher MD    Provider assisted by: Nick Poe LPN    Patient assisted by: Self    How tolerated by patient: tolerated well     Post Procedural Pain Scale: 0    Comments: Specimen sent to pathology

## 2022-06-15 NOTE — PROGRESS NOTES
Identified pt with two pt identifiers(name and ). Reviewed record in preparation for visit and have obtained necessary documentation. All patient medications has been reviewed. Chief Complaint   Patient presents with    Breast Mass     Mass of lower outer quadrant of right breast       Health Maintenance Due   Topic    COVID-19 Vaccine (1)    Medicare Yearly Exam        Vitals:    06/15/22 1309   BP: 100/62   Pulse: 68   Resp: 18   Temp: 97.3 °F (36.3 °C)   TempSrc: Temporal   SpO2: 97%   Weight: 76.2 kg (168 lb)   Height: 4' 7\" (1.397 m)   PainSc:   0 - No pain       4. Have you been to the ER, urgent care clinic since your last visit? Hospitalized since your last visit? [facility]    5. Have you seen or consulted any other health care providers outside of the [address] [place] [address] since your last visit? Include any pap smears or colon screening. No      Patient is accompanied by son I have received verbal consent from Tay May to discuss any/all medical information while they are present in the room. Discharged

## 2022-06-15 NOTE — LETTER
6/15/2022    Patient: Duane Prieto   YOB: 1930   Date of Visit: 6/15/2022     Lionel Christiansen MD  [address] [place] [address]    Dear Lionel Christiansen MD,      Thank you for referring Ms. Antonio Leong to  [facility] for evaluation. My notes for this consultation are attached. If you have questions, please do not hesitate to call me. I look forward to following your patient along with you.       Sincerely,    Gregg Perez MD

## 2022-06-17 NOTE — PROGRESS NOTES
Visit Vitals  /78 (BP 1 Location: Left arm)   Pulse 79   Temp 97.3 °F (36.3 °C) (Temporal)   Resp 18   Ht 4' 7\" (1.397 m)   SpO2 99%   BMI 39.05 kg/m²     Chief Complaint   Patient presents with   [facility] Annual Wellness Visit     1. Have you been to the ER, urgent care clinic since your last visit? Hospitalized since your last visit? No    2. Have you seen or consulted any other health care providers outside of the [address] [place] [address] since your last visit? Include any pap smears or colon screening. No  ______________________________________________________________________    Batsheva Garcia is a 80 y.o. female and presents for annual Medicare Wellness Visit. Problem List: Reviewed with patient and discussed risk factors. Patient Active Problem List   Diagnosis Code    Arthritis M19.90    S/P hysterectomy Z90.710    Hyperlipidemia E78.5    HTN (hypertension) I10    Lumbago with sciatica M54.40    Severe obesity (BMI 35.0-39. 9) with comorbidity ([facility] E66.01    Coronary artery disease involving native coronary artery without angina pectoris I25.10    Paroxysmal atrial fibrillation (HCC) I48.0    Advance directive discussed with patient Z71.89    BMI 40.0-44.9, adult (HCC) Z68.41    History of GI bleed Z87.19    Chronic deep vein thrombosis (DVT) of distal vein of left lower extremity (HCC) I82.5Z2    Pedal edema R60.0    Mass of upper outer quadrant of right breast N63.11    Axillary lymphadenopathy R59.0       1. Have you been to the ER, urgent care clinic since your last visit? Hospitalized since your last visit? No    2. Have you seen or consulted any other health care providers outside of the [address] [place] [address] since your last visit? Include any pap smears or colon screening.  No    Current medical providers:  Patient Care Team:  Lexii Mcdowell MD as PCP - General (Family Medicine)  Lexii Mcdowell MD as PCP - [facility] Empaneled Provider  Ronda Jack NP as Nurse Practitioner (Cardiovascular Disease Physician)  Lina Pepe MD as Physician (Cardiovascular Disease Physician)  Percy Mckeon MD as Surgeon (General Surgery)    [facility], [address], Medications/Allergies: reviewed, on chart. Female Alcohol Screening: On any occasion during the past 3 months, have you had more than 3 drinks containing alcohol? No    Do you average more than 7 drinks per week? Do you average more than 14 drinks per week? No    ROS:  Constitutional: No fever, chills or weight loss  Respiratory: No cough, SOB   CV: No chest pain or Palpitations    Objective:  Visit Vitals  /78 (BP 1 Location: Left arm)   Pulse 79   Temp 97.3 °F (36.3 °C) (Temporal)   Resp 18   Ht 4' 7\" (1.397 m)   SpO2 99%   BMI 39.05 kg/m²    Body mass index is 39.05 kg/m². Depression Screen:   3 most recent PHQ Screens 6/17/2022   PHQ Not Done -   Little interest or pleasure in doing things Not at all   Feeling down, depressed, irritable, or hopeless Not at all   Total Score PHQ 2 0     Depression screening performed and reviewed with patient for 8-15 minutes    Fall Risk Assessment:    Fall Risk Assessment, last 12 mths 6/17/2022   Able to walk? No   Fall in past 12 months? 0   Do you feel unsteady? 0   Are you worried about falling 0   Is TUG test greater than 12 seconds? -   Is the gait abnormal? -   Number of falls in past 12 months -   Fall with injury? -       Functional Ability:   Does the patient exhibit a steady gait?  no   How long did it take the patient to get up and walk from a sitting position? Is the patient self reliant?  (ie can do own laundry, meals, household chores)  no     Does the patient handle his/her own medications?  no     Does the patient handle his/her own money? no     Is the patients home safe (ie good lighting, handrails on stairs and bath, etc.)? no     Did you notice or did patient express any hearing difficulties?    no     Did you notice or did patient express any vision difficulties? no       Advance Care Planning:   Patient was offered the opportunity to discuss advance care planning:  no     Does patient have an Advance Directive:  yes   If no, did you provide information on [facility]ctions? yes       Plan:    Breast pain s/p bx. No sign worsening infection on inspection today. con't to follow up with surgery, oncology. finish abx course. Tx pain with tramadol, not a good candidate for NSAID 2nd to plavix tx. Orders Placed This Encounter    Depression Screen Annual    traMADoL (ULTRAM) 50 mg tablet       Health Maintenance   Topic Date Due    COVID-19 Vaccine (1) Never done    Shingrix Vaccine Age 50> (1 of 2) 08/28/2050 (Originally 3/5/1980)    Lipid Screen  08/13/2022    Depression Screen  06/14/2023    Medicare Yearly Exam  06/18/2023    DTaP/Tdap/Td series (2 - Td or Tdap) 08/04/2027    Flu Vaccine  Completed    Pneumococcal 65+ years  Completed    Bone Densitometry (Dexa) Screening  Addressed       *Patient verbalized understanding and agreement with the plan. A copy of the After Visit Summary with personalized health plan was given to the patient today.   Patient advised that that will be a visit charge for services in addition to [employer]

## 2022-06-20 NOTE — TELEPHONE ENCOUNTER
Komal Wilde from Mammography Dept at [facility] called about a mutual patient. Patient is coming in for a mammogram tomorrow, but it appears that the pathology report has not yet been reviewed with patient. They are wondering if Dr. Eran Maria will be reviewing this with the patient prior to patient coming in for her mammogram tomorrow?     Please call Grady Case to discuss at:  [phone]
Spoke with Missy Pryor to let her know that Dr Lanny Carter reached out to patient's son in regards to pathology results.
Name band;

## 2022-06-20 NOTE — TELEPHONE ENCOUNTER
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

## 2022-06-20 NOTE — TELEPHONE ENCOUNTER
Jimbo Bustillos called regarding patient and is wanting to know what stage her results are.     Please call  [phone]

## 2022-06-21 NOTE — TELEPHONE ENCOUNTER
Called and spoke with Lewis the patient's son to let him know we need to get some additional tests done to know the grade of the cancer. Called scheduling and he is getting the bone scan and CT set up. He Jackson

## 2022-06-22 NOTE — PROGRESS NOTES
Dr Deborah Malone spoke with the pt her son and daughter right birads 6 left birads 4, I have called and spoke with Juanjo Reyes nurse for Dr Chavis Side office. I spoke with Prosepr Schultz's office, discussed the [address] [place] [address] results. Pt has an appt in the near future.   dsw

## 2022-06-22 NOTE — TELEPHONE ENCOUNTER
Yaritza Dobson called from the radiology dept. Patient had her mammogram done (birads 4) and they found a left breast mass at 12 clock that looks primary cystic. They just wanted you to be aware.

## 2022-06-27 NOTE — TELEPHONE ENCOUNTER
Per son pt not complaining of pain, pt sitting they are weak and just give out. Pt not rosita to shift weight. Can use her arms same as before. They have been pivot-transferring her from recliner to wheel chair to toilet.

## 2022-06-27 NOTE — TELEPHONE ENCOUNTER
A couple of days ago this started. Son states she cannot bear her weight to get out of her lift recliner to go to the toilet or elsewhere.

## 2022-06-27 NOTE — TELEPHONE ENCOUNTER
83 yo FM here for follow up.  Taking trazodone now.  Eating and sleeping better.  Bloats at times.  Has gained almost 10#.  Takes daily probiotics and apple cider vinegar. Please call enzo Scott [phone]    Re: pt no use of legs x 3 days

## 2022-06-28 PROBLEM — N19 RENAL FAILURE: Status: ACTIVE | Noted: 2022-01-01

## 2022-06-28 PROBLEM — N19 RENAL FAILURE: Status: ACTIVE | Noted: 2022-06-28

## 2022-06-28 PROBLEM — E86.0 DEHYDRATION: Status: ACTIVE | Noted: 2022-01-01

## 2022-06-28 PROBLEM — E86.0 DEHYDRATION: Status: ACTIVE | Noted: 2022-06-28

## 2022-06-28 NOTE — ED NOTES
Pt family concerned about pt staying here, would like to take her home. Have left to go to bank but son states he will be coming back.

## 2022-06-28 NOTE — ED TRIAGE NOTES
Rapid response called in cafeteria 15 minutes prior to arrival for pt. Pt was choking, had heimlich maneuver by staff member before arrival of rapid team. Pt then assessed by ED Provider and determination pt should be assessed in ED after vomiting, potential aspiration concerns. Pt non-ambulatory at baseline, hx breast cancer.

## 2022-06-28 NOTE — ED NOTES
Pt straight cathed for urine, placed in clean brief, previous soiled with urine. Pt unable to assist rolling in bed, 2 staff required r/t pt large body habitus and chronic weakness.

## 2022-06-28 NOTE — ED PROVIDER NOTES
EMERGENCY DEPARTMENT HISTORY AND PHYSICAL EXAM          Date: 2022  Patient Name: Berkley Jackson    History of Presenting Illness     Chief Complaint   Patient presents with    Aspiration       History Provided By: Patient    HPI: Berkley Jackson is a 80 y.o. female, pmhx Anali  1560, who presents via wheelchair from the cafeteria to the ED c/o choking episode    Patient was in the cafeteria eating lunch before a test that she had scheduled at 230 for a bone scan. While eating a sausage roll sandwich she had a choking episode. Staff for close by, provide the Heimlich twice with some pats on the back and patient spit out the food. Rapid response was called and I responded to the rapid response. On arrival patient was awake and alert with normal coloration without evidence of cyanosis. She was able to answer questions and states she felt okay but shortly thereafter had an episode of vomiting and recommended family bring her to the emergency room for evaluation. On arrival here patient has no complaints. Son states she is doing okay except for the past 3 days she has not been wanting to eat. He notes she has not been able to stand and transition. He also notes that yesterday evening she was complaining a lot of pain where she has a breast tumor on the right. He gave her a dose of tramadol and a Tylenol this morning and states she did not have any complaints of pain today. PCP: Viktoria Castillo MD    Allergies: nkda  There are no other complaints, changes, or physical findings at this time.      Current Facility-Administered Medications   Medication Dose Route Frequency Provider Last Rate Last Admin    sodium chloride (NS) flush 5-10 mL  5-10 mL IntraVENous PRN Lina Daniels MD        0.9% sodium chloride infusion  250 mL/hr IntraVENous CONTINUOUS Lina Daniels  mL/hr at 22 1531 250 mL/hr at 22 1531     Current Outpatient Medications   Medication Sig Dispense Refill    carvediloL (COREG) 25 mg tablet TAKE 1 TABLET BY MOUTH 2 TIMES A DAY FOR HEART AND PRESSURE 180 Tablet 3    spironolactone (ALDACTONE) 50 mg tablet TAKE 1 TABLET BY MOUTH DAILY 90 Tablet 3    traMADoL (ULTRAM) 50 mg tablet Take 1 Tablet by mouth three (3) times daily as needed for Pain for up to 15 days. Max Daily Amount: 150 mg. Indications: cancer pain 45 Tablet 0    amLODIPine (NORVASC) 2.5 mg tablet TAKE 1 TABLET BY MOUTH EVERY DAY FOR BLOOD PRESSURE 90 Tablet 3    atorvastatin (LIPITOR) 40 mg tablet TAKE 1 TABLET BY MOUTH EVERY DAY 90 Tablet 3    bumetanide (BUMEX) 1 mg tablet Take 1 Tablet by mouth daily. Indications: fluid/ edema 90 Tablet 3    ferrous sulfate 325 mg (65 mg iron) tablet Take 1 Tablet by mouth daily. 90 Tablet 3    pantoprazole (PROTONIX) 40 mg tablet Take 1 Tablet by mouth daily. 90 Tablet 3    clopidogreL (PLAVIX) 75 mg tab TAKE 1 TABLET BY MOUTH EVERY DAY to prevent heart attack and stroke 90 Tablet 3    Walker misc 1 Each by Does Not Apply route daily. Needs a byron walker due to low height 1 Each 0    diclofenac (VOLTAREN) 1 % gel Apply  to affected area four (4) times daily. 100 g 4    nitroglycerin (NITROSTAT) 0.4 mg SL tablet 1 Tab by SubLINGual route every five (5) minutes as needed for Chest Pain for up to 3 doses. 20 Tab 1       Past History     Past Medical History:  Past Medical History:   Diagnosis Date    Anticoagulation goal of INR 1.5 to 2.5     Breast cancer ([facility] [address] 2022    breast ca    Diabetes ([facility] [address]     Hyperlipemia     Hypertension     Left leg DVT ([facility] [address] 07/2019    Current DVT in same leg.     Obesity        Past Surgical History:  Past Surgical History:   Procedure Laterality Date    HX APPENDECTOMY      HX HYSTERECTOMY      US BX BREAST RT 1ST LESION W/CLIP AND SPECIMEN Right 2022    and node right ax node both breast CA       Family History:  Family History   Problem Relation Age of Onset    Hypertension Mother     Preeclampsia Mother     Panic disorder Mother     Heart Disease Neg Hx     Cancer Neg Hx        Social History:  Social History     Tobacco Use    Smoking status: Former Smoker     Packs/day: 0.50     Years: 10.00     Pack years: 5.00     Types: Cigarettes     Quit date: 1967     Years since quittin.1    Smokeless tobacco: Never Used   Vaping Use    Vaping Use: Never used   Substance Use Topics    Alcohol use: No     Alcohol/week: 0.0 standard drinks    Drug use: No     Types: Prescription       Allergies:  No Known Allergies      Review of Systems   Review of Systems   Constitutional: Negative for activity change, appetite change, chills, fever and unexpected weight change. HENT: Negative for congestion. Eyes: Negative for pain and visual disturbance. Respiratory: Negative for cough and shortness of breath. Cardiovascular: Negative for chest pain. Gastrointestinal: Negative for abdominal pain, diarrhea, nausea and vomiting. Genitourinary: Negative for dysuria. Musculoskeletal: Negative for back pain. Skin: Negative for rash. Neurological: Negative for headaches. Physical Exam   Physical Exam  Vitals and nursing note reviewed. Constitutional:       Appearance: She is well-developed. She is not diaphoretic. Comments: Is an obese female with normal vital signs currently in minimal acute distress   HENT:      Head: Normocephalic and atraumatic. Eyes:      General:         Right eye: No discharge. Left eye: No discharge. Conjunctiva/sclera: Conjunctivae normal.      Pupils: Pupils are equal, round, and reactive to light. Cardiovascular:      Rate and Rhythm: Normal rate and regular rhythm. Heart sounds: Normal heart sounds. No murmur heard. Pulmonary:      Effort: Pulmonary effort is normal. No respiratory distress. Breath sounds: Rhonchi (Lower lung, left) present. No wheezing (Scattered) or rales.    Abdominal:      General: Bowel sounds are normal. There is no distension. Palpations: Abdomen is soft. Tenderness: There is no abdominal tenderness. Musculoskeletal:         General: Normal range of motion. Cervical back: Normal range of motion and neck supple. Skin:     General: Skin is warm and dry. Findings: No rash. Neurological:      Mental Status: She is alert and oriented to person, place, and time. Cranial Nerves: No cranial nerve deficit. Motor: No abnormal muscle tone. Diagnostic Study Results     Labs -     Recent Results (from the past 12 hour(s))   BUN    Collection Time: 06/28/22 10:42 AM   Result Value Ref Range     (H) 6 - 20 MG/DL   CREATININE    Collection Time: 06/28/22 10:42 AM   Result Value Ref Range    Creatinine 3.57 (H) 0.55 - 1.02 MG/DL    GFR est AA 14 (L) >60 ml/min/1.73m2    GFR est non-AA 12 (L) >60 ml/min/1.73m2   LACTIC ACID    Collection Time: 06/28/22  1:10 PM   Result Value Ref Range    Lactic acid 2.5 (HH) 0.4 - 2.0 MMOL/L   METABOLIC PANEL, COMPREHENSIVE    Collection Time: 06/28/22  1:10 PM   Result Value Ref Range    Sodium 130 (L) 136 - 145 mmol/L    Potassium 5.2 (H) 3.5 - 5.1 mmol/L    Chloride 95 (L) 97 - 108 mmol/L    CO2 24 21 - 32 mmol/L    Anion gap 11 5 - 15 mmol/L    Glucose 119 (H) 65 - 100 mg/dL     (H) 6 - 20 MG/DL    Creatinine 3.83 (H) 0.55 - 1.02 MG/DL    BUN/Creatinine ratio 34 (H) 12 - 20      GFR est AA 13 (L) >60 ml/min/1.73m2    GFR est non-AA 11 (L) >60 ml/min/1.73m2    Calcium 9.7 8.5 - 10.1 MG/DL    Bilirubin, total 1.0 0.2 - 1.0 MG/DL    ALT (SGPT) 64 12 - 78 U/L    AST (SGOT) 156 (H) 15 - 37 U/L    Alk.  phosphatase 239 (H) 45 - 117 U/L    Protein, total 8.1 6.4 - 8.2 g/dL    Albumin 2.7 (L) 3.5 - 5.0 g/dL    Globulin 5.4 (H) 2.0 - 4.0 g/dL    A-G Ratio 0.5 (L) 1.1 - 2.2     CBC WITH AUTOMATED DIFF    Collection Time: 06/28/22  1:10 PM   Result Value Ref Range    WBC 11.9 (H) 3.6 - 11.0 K/uL    RBC 4.45 3.80 - 5.20 M/uL    HGB 13.8 11.5 - 16.0 g/dL    HCT 40.7 35.0 - 47.0 %    MCV 91.5 80.0 - 99.0 FL    MCH 31.0 26.0 - 34.0 PG    MCHC 33.9 30.0 - 36.5 g/dL    RDW 13.7 11.5 - 14.5 %    PLATELET 995 020 - 949 K/uL    MPV 11.7 8.9 - 12.9 FL    NRBC 1.0 (H) 0  WBC    ABSOLUTE NRBC 0.12 (H) 0.00 - 0.01 K/uL    NEUTROPHILS 68 32 - 75 %    LYMPHOCYTES 26 12 - 49 %    MONOCYTES 6 5 - 13 %    EOSINOPHILS 0 0 - 7 %    BASOPHILS 0 0 - 1 %    IMMATURE GRANULOCYTES 0 0.0 - 0.5 %    ABS. NEUTROPHILS 8.0 1.8 - 8.0 K/UL    ABS. LYMPHOCYTES 3.0 0.8 - 3.5 K/UL    ABS. MONOCYTES 0.7 0.0 - 1.0 K/UL    ABS. EOSINOPHILS 0.0 0.0 - 0.4 K/UL    ABS. BASOPHILS 0.0 0.0 - 0.1 K/UL    ABS. IMM.  GRANS. 0.1 (H) 0.00 - 0.04 K/UL    DF AUTOMATED     TROPONIN-HIGH SENSITIVITY    Collection Time: 06/28/22  1:10 PM   Result Value Ref Range    Troponin-High Sensitivity 182 (HH) 0 - 51 ng/L   MAGNESIUM    Collection Time: 06/28/22  1:10 PM   Result Value Ref Range    Magnesium 2.1 1.6 - 2.4 mg/dL   PHOSPHORUS    Collection Time: 06/28/22  1:10 PM   Result Value Ref Range    Phosphorus 4.5 2.6 - 4.7 MG/DL   EKG, 12 LEAD, INITIAL    Collection Time: 06/28/22  1:20 PM   Result Value Ref Range    Ventricular Rate 73 BPM    Atrial Rate 73 BPM    P-R Interval 164 ms    QRS Duration 84 ms    Q-T Interval 408 ms    QTC Calculation (Bezet) 449 ms    Calculated P Axis 72 degrees    Calculated R Axis -46 degrees    Calculated T Axis 40 degrees    Diagnosis       Normal sinus rhythm with sinus arrhythmia  Left anterior fascicular block  Minimal voltage criteria for LVH, may be normal variant  Abnormal ECG  No previous ECGs available     GLUCOSE, POC    Collection Time: 06/28/22  1:21 PM   Result Value Ref Range    Glucose (POC) 104 65 - 117 mg/dL    Performed by LISA BRANCH    URINALYSIS W/ REFLEX CULTURE    Collection Time: 06/28/22  1:25 PM    Specimen: Urine   Result Value Ref Range    Color YELLOW/STRAW      Appearance CLEAR CLEAR      Specific gravity 1.010 1.003 - 1.030      pH (UA) 6.0 5.0 - 8.0 Protein Negative NEG mg/dL    Glucose Negative NEG mg/dL    Ketone Negative NEG mg/dL    Bilirubin Negative NEG      Blood Negative NEG      Urobilinogen 0.2 0.2 - 1.0 EU/dL    Nitrites Negative NEG      Leukocyte Esterase Negative NEG      WBC 0-4 0 - 4 /hpf    RBC 0-5 0 - 5 /hpf    Epithelial cells FEW FEW /lpf    Bacteria Negative NEG /hpf    UA:UC IF INDICATED CULTURE NOT INDICATED BY UA RESULT CNI     METABOLIC PANEL, BASIC    Collection Time: 06/28/22  4:07 PM   Result Value Ref Range    Sodium 132 (L) 136 - 145 mmol/L    Potassium 5.6 (H) 3.5 - 5.1 mmol/L    Chloride 98 97 - 108 mmol/L    CO2 24 21 - 32 mmol/L    Anion gap 10 5 - 15 mmol/L    Glucose 111 (H) 65 - 100 mg/dL     (H) 6 - 20 MG/DL    Creatinine 3.52 (H) 0.55 - 1.02 MG/DL    BUN/Creatinine ratio 36 (H) 12 - 20      GFR est AA 15 (L) >60 ml/min/1.73m2    GFR est non-AA 12 (L) >60 ml/min/1.73m2    Calcium 9.2 8.5 - 10.1 MG/DL   TROPONIN-HIGH SENSITIVITY    Collection Time: 06/28/22  4:07 PM   Result Value Ref Range    Troponin-High Sensitivity 170 (HH) 0 - 51 ng/L   LACTIC ACID    Collection Time: 06/28/22  6:41 PM   Result Value Ref Range    Lactic acid 1.3 0.4 - 2.0 MMOL/L       Radiologic Studies -   CT HEAD WO CONT   Final Result   1. No evidence of intracranial hemorrhage. 2. Evidence of mild, patchy cerebral atrophy. XR CHEST PORT   Final Result   Clear lungs. CT Results  (Last 48 hours)               06/28/22 1345  CT HEAD WO CONT Final result    Impression:  1. No evidence of intracranial hemorrhage. 2. Evidence of mild, patchy cerebral atrophy. Narrative:  EXAM: CT HEAD WO CONT       INDICATION: choking episode with weakness       COMPARISON: None. CONTRAST: None. TECHNIQUE: Unenhanced CT of the head was performed using 5 mm images. Brain and   bone windows were generated.   CT dose reduction was achieved through use of a   standardized protocol tailored for this examination and automatic exposure control for dose modulation. FINDINGS:   No calvarial abnormalities are detected. There is no evidence of intracranial   hemorrhage. There is evidence of mild, patchy cerebral atrophy. 06/28/22 1201  CT CHEST ABD PELV WO CONT Final result    Impression:  1. Presence of a moderately large mass lesion involving the lateral aspect of   the right breast. No evidence of invasion of the adjacent chest wall. No   evidence of intrathoracic, intra-abdominal or intrapelvic metastatic disease. 2. Evidence of degenerative/scoliotic change involving the focal lumbar spine as   described above. Evidence of apparent sclerosis/blastic change involving L3. Presence of focal areas of radiolucency within L3, L1 and T10 as described   above. Further studies may prove useful. 3. Evidence of moderate degenerative change involving the left hip. 4. Presence of some fibrotic scarring in the posteromedial aspect of the right   lung base. Narrative:  EXAM: CT CHEST ABD PELV WO CONT       INDICATION: locally advanced breast ca       COMPARISON: None. CONTRAST: None. TECHNIQUE:     Spiral images through the chest, abdomen and pelvis were obtained without   administration of intravenous contrast material. Oral contrast material was   administered prior to this examination. Dose reduction was achieved through use   of a standardized protocol tailored for this examination and automatic exposure   control for dose modulation. FINDINGS:   Chest wall a moderately large (6.6 cm x 4.1 cm x 6.6 cm) as is noted within the   lateral aspect of the right breast (see axial image 35). There is no extension   of this mass lesion to the chest wall. No adjacent, destructive bone lesions are   seen. THYROID: No nodule. MEDIASTINUM: No mass or lymphadenopathy. Presence of a calcified precarinal   lymph node (see axial image 24). KAYLYNN: No mass or lymphadenopathy. THORACIC AORTA: No aneurysm.    MAIN PULMONARY ARTERY: Normal in caliber. HEART: Normal in size. ESOPHAGUS: No wall thickening or dilatation. TRACHEA/BRONCHI: Patent. PLEURA: No effusion or pneumothorax. LUNGS: As seen on lung window axial images 42 through 48, some linear fibrotic   scarring is noted in the posteromedial aspect of the right lung base. Liver: No mass or biliary dilatation. Biliary tree: As seen on axial image 58, a very subtle increased density is   noted in the dependent portion of the fundus of the gallbladder. This is   suggestive of the presence of gallstones. A gallbladder ultrasound examination   may prove useful for confirmation. The CBD is not dilated. Spleen: Within normal limits. Pancreas: No inflammation, mass or ductal dilatation. Adrenals: Unremarkable. Kidneys: No calculus or hydronephrosis. Stomach: Unremarkable. Small bowel: No dilatation or wall thickening. Colon: No dilatation or wall thickening. Appendix: Surgical absence of the appendix. Peritoneum: No ascites or pneumoperitoneum. Retroperitoneum: No lymphadenopathy or aortic aneurysm. Reproductive organs: Previous hysterectomy   Urinary bladder: No mass or calculus. Bones: There is evidence of mild thoracolumbar scoliosis convex to the right. As   seen on sagittal images 44 through 46, there is relative increased density of   L3. Additionally, focal areas of relative radiolucency are noted within the   posterior aspect of this vertebral body as well as the anterior aspect of L1 and   the anterior aspect of T10. It is difficult to exclude metastatic disease. Further studies are recommended. Evidence of moderate degenerative change   involving the left hip. There is narrowing of the hip joint space and there is   evidence of subchondral cyst formation and sclerotic change involving the   acetabulum and the femoral head.    Abdominal wall: As seen on axial image 79 and sagittal image 44, a 1.7 cm   fat-containing umbilical hernia is present. No loops of bowel are noted within   this hernia. Additional comments: N/A               CXR Results  (Last 48 hours)               06/28/22 1315  XR CHEST PORT Final result    Impression:  Clear lungs. Narrative:  PORTABLE CHEST RADIOGRAPH/S: 6/28/2022 1:15 PM       INDICATION: Pneumonia. HISTORY (per electronic medical record): Rapid response called for choking;   patient had Heimlich maneuver in the cafeteria. Possible aspiration. COMPARISON: CT 6/28/2022. TECHNIQUE: Portable frontal upright radiograph/s of the chest.       FINDINGS:    The lungs are clear. The central airways are patent. No pneumothorax or pleural   effusion. 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 Medical Decision Making   I am the first provider for this patient. I reviewed the vital signs, available nursing notes, past medical history, past surgical history, family history and social history. Vital Signs-Reviewed the patient's vital signs. Patient Vitals for the past 12 hrs:   Temp Pulse Resp BP SpO2   06/28/22 1902 -- 62 18 101/62 96 %   06/28/22 1830 -- 68 18 (!) 99/56 98 %   06/28/22 1800 -- 67 18 (!) 101/57 98 %   06/28/22 1700 -- 67 18 (!) 104/56 98 %   06/28/22 1630 -- 64 18 (!) 102/53 98 %   06/28/22 1600 -- 66 18 (!) 106/57 98 %   06/28/22 1530 -- 66 18 (!) 107/53 --   06/28/22 1500 -- 69 20 108/61 100 %   06/28/22 1430 -- 70 20 100/71 100 %   06/28/22 1330 -- 69 20 121/60 100 %   06/28/22 1311 -- -- -- -- 100 %   06/28/22 1251 97.6 °F (36.4 °C) 73 20 (!) 118/58 100 %       Pulse Oximetry Analysis - 100% on RA    Records Reviewed: Nursing Notes, Old Medical Records, Previous Radiology Studies and Previous Laboratory Studies    Provider Notes (Medical Decision Making):   MDM: [place] female brought to the ER after choking episode in the cafeteria.   Concern for possible aspiration she had rhonchi on the left after a choking episode when I saw her during rapid response. She is awake and alert and able to answer questions clearly upon arrival to the ER when she was not when he first saw her in the cafeteria. Broad-spectrum evaluation to be completed for electrolyte derangement, infection and aspiration. ED Course:   Initial assessment performed. The patients presenting problems have been discussed, and they are in agreement with the care plan formulated and outlined with them. I have encouraged them to ask questions as they arise throughout their visit. EKG interpretation: (Preliminary)  Rhythm: Normal sinus rhythm at a rate of 73 bpm; normal AR; normal QRS; normal QTC with left axis deviation. There is no evidence of acute ST elevation or depression. This EKG was interpreted by ED Provider Roxy Romano MD    ED Course as of 06/28/22 1918 Tue Jun 28, 2022   1354  have been notified of elevated lactic acid and troponin result. On review of chemistry is the potassium is also slightly elevated at 5.2 but does not comment on hemolysis. [JT]   1355 BUN and creatinine significantly increased compared to last set of labs in November GFR was 34%. Troponin possibly elevated secondary to decreased renal filtration. We will check a repeat troponin in 2 hours. [JT]   1705 Patient resting and receiving IV fluids. Repeat troponin is down. We will repeat her BMP after fluids completed. We have had multiple discussions regarding her end-of-life wishes and where she would like to go from here. She states at this point she does not want to see a nephrologist and does not want to seek treatment for her renal failure. She does not really want to be hospitalized but her family would like her to be hospitalized overnight for IV fluids. I offered him palliative care and hospice but they state they are not ready for that. They would like to follow-up with her primary care for that discussion.  [JT]      ED Course User Index  [JT] Bakari Monroe MD 6PM  Discussed case with Dr. Ashley Morales at the bedside. She agrees to admit the patient for the management and care    Critical Care Time:   0      Diagnosis     Clinical Impression:   1. Acute renal failure, unspecified acute renal failure type (HCC)    2. Elevated troponin    3. Choking episode        PLAN:  Admission for further management and care      Please note, this dictation was completed with [email or web address], the computer voice recognition software. Quite often unanticipated grammatical, syntax, homophones, and other interpretive errors are inadvertently transcribed by the computer software. Please disregard these errors. Please excuse any errors that have escaped final proof reading.

## 2022-06-29 NOTE — PROGRESS NOTES
Care Management Interventions  PCP Verified by CM: Yes (Dr. Kota Messer MD)  Last Visit to PCP: 06/17/22  Palliative Care Criteria Met (RRAT>21 & CHF Dx)?: No  Mode of Transport at Discharge: Other (see comment) (POV/Son)  Transition of Care Consult (CM Consult): Discharge Planning  MyChart Signup: No  Discharge Durable Medical Equipment: No  Physical Therapy Consult: No  Occupational Therapy Consult: No  Speech Therapy Consult: Yes  Support Systems: Child(james)  Confirm Follow Up Transport: Self  [place] Resource Information Provided?: No  Discharge Location  Patient Expects to be Discharged to[de-identified] Home     Mrs. Kiley Soto was admitted under Inpatient status 06/28/22 with Renal Failure and Dehydration. She had a choking episode here in the hospital cafeteria. I advised Mrs. Kiley Soto of her Inpatient status and provided an explanation. Document was signed and received at [facility] intake. Mrs. Kiley Soto lives in [facility]. She noted her son is in and out of the home but does not stay with her full time. She uses a wheel chair and has a medical alert pendant. She said she is able to manage her own care needs. I gave her the Care Management introductory letter with contact information included. The blank Advance Directive along with the explanatory brochure Your Right to Decide was also given. Son Lazarus Myrtle arrived and they will be completing the Advance Directive here. I gave her the contact number for [facility] in case she decides she wants to receive meals on wheels. We discussed referral to hospice care per discharge planning.     Advance Care Planning     General Advance Care Planning (ACP) Conversation      Date of Conversation: 06/29/22  Conducted with: Patient with Decision Making Capacity    Healthcare Decision Maker:     Primary Decision Maker: Bella [place] - [phone]  Click here to complete [address] including selection of the Healthcare Decision Maker Relationship (ie \"Primary\")      Content/Action Overview:   Has NO ACP documents/care preferences - information provided, considering goals and options    Patient is DNR    Length of Voluntary ACP Conversation in minutes:  <16 minutes (Non-Billable)    Kiara Castrenanbernarda     Reason for Admission:  Renal Failure, UTI                     RUR Score:  19% Medium                Plan for utilizing home health: Hospice         PCP: First and Last name:  Elizabeth Cornejo MD     Name of Practice: [facility]   Are you a current patient: Yes/No: YES   Approximate date of last visit: 06/17/22   Can you participate in a virtual visit with your PCP:    Yes with assistance                 Current Advanced Directive/Advance Care Plan: DNR      Healthcare Decision Maker:   Click here to complete [address] including selection of the Healthcare Decision Maker Relationship (ie \"Primary\")             Primary Decision MakerAstrid Flora Abad [place] - [phone]                  Transition of Care Plan: Home with [address]

## 2022-06-29 NOTE — PROGRESS NOTES
Problem: Pressure Injury - Risk of  Goal: *Prevention of pressure injury  Description: Document Richar Scale and appropriate interventions in the flowsheet. Outcome: Progressing Towards Goal  Note: Pressure Injury Interventions:       Moisture Interventions: Absorbent underpads,Apply protective barrier, creams and emollients,Internal/External urinary devices    Activity Interventions: Assess need for specialty bed    Mobility Interventions: Assess need for specialty bed,HOB 30 degrees or less,PT/OT evaluation                          Problem: Patient Education: Go to Patient Education Activity  Goal: Patient/Family Education  Outcome: Progressing Towards Goal     Problem: Aspiration - Risk of  Goal: *Absence of aspiration  Outcome: Progressing Towards Goal     Problem: Falls - Risk of  Goal: *Absence of Falls  Description: Document Fermin Fall Risk and appropriate interventions in the flowsheet.   Outcome: Progressing Towards Goal  Note: Fall Risk Interventions:  Mobility Interventions: Bed/chair exit alarm,Patient to call before getting OOB,PT Consult for mobility concerns         Medication Interventions: Assess postural VS orthostatic hypotension,Evaluate medications/consider consulting pharmacy,Teach patient to arise slowly    Elimination Interventions: Call light in reach,Patient to call for help with toileting needs    History of Falls Interventions: Bed/chair exit alarm,Door open when patient unattended,Room close to nurse's station

## 2022-06-29 NOTE — H&P
[facility]   Admission History & Physical        6/29/2022 4:05 PM  Patient: Gay Broderick 3/5/1930  PCP: Shonda Cerda MD    HISTORY  Chief Complaint:   Chief Complaint   Patient presents with    Aspiration     Patient is a pleasant 80year old female who presented to the ER after a choking episode in the cafeteria. Was in the cafeteria eating a polish sausage when she had the choking episode. Was able to spit out the food after the Heimlich maneuver was performed twice. Rapid Response was also called. She was able to answer questions, and there was no dyspnea or color changes. She was transferred to the ER and found to have elevated troponin, acute renal failure and was dehydrated. She denies dysuria, gross hematuria, abdominal pain, constipation, diarrhea, headache. History of breast tumor on right. Denies weight loss. Past Medical History:  Past Medical History:   Diagnosis Date    Anticoagulation goal of INR 1.5 to 2.5     Breast cancer ([address] 2022    breast ca    Diabetes ([address]     Hyperlipemia     Hypertension     Left leg DVT ([address] 07/2019    Current DVT in same leg.  Obesity        Past Surgical History:  Past Surgical History:   Procedure Laterality Date    HX APPENDECTOMY      HX HYSTERECTOMY      US BX BREAST RT 1ST LESION W/CLIP AND SPECIMEN Right 2022    and node right ax node both breast CA       Medication:  Prior to Admission medications    Medication Sig Start Date End Date Taking? Authorizing Provider   carvediloL (COREG) 25 mg tablet TAKE 1 TABLET BY MOUTH 2 TIMES A DAY FOR HEART AND PRESSURE 6/23/22   Shonda Cerad MD   spironolactone (ALDACTONE) 50 mg tablet TAKE 1 TABLET BY MOUTH DAILY 6/23/22   Shonda Cerda MD   traMADoL (ULTRAM) 50 mg tablet Take 1 Tablet by mouth three (3) times daily as needed for Pain for up to 15 days. Max Daily Amount: 150 mg.  Indications: cancer pain 6/17/22 7/2/22  Shonda Cerda MD amLODIPine (NORVASC) 2.5 mg tablet TAKE 1 TABLET BY MOUTH EVERY DAY FOR BLOOD PRESSURE 3/18/22   Es Lo MD   atorvastatin (LIPITOR) 40 mg tablet TAKE 1 TABLET BY MOUTH EVERY DAY 3/3/22   Saleem Strickland MD   bumetanide (BUMEX) 1 mg tablet Take 1 Tablet by mouth daily. Indications: fluid/ edema 21   Es Lo MD   ferrous sulfate 325 mg (65 mg iron) tablet Take 1 Tablet by mouth daily. 21   Es Lo MD   pantoprazole (PROTONIX) 40 mg tablet Take 1 Tablet by mouth daily. 21   Es Lo MD   clopidogreL (PLAVIX) 75 mg tab TAKE 1 TABLET BY MOUTH EVERY DAY to prevent heart attack and stroke 21   Es Lo MD   Tennille Favia misc 1 Each by Does Not Apply route daily. Needs a byron walker due to low height 21   Morena Randolph MD   diclofenac (VOLTAREN) 1 % gel Apply  to affected area four (4) times daily. 3/26/20   Yazmin Ruiz MD   nitroglycerin (NITROSTAT) 0.4 mg SL tablet 1 Tab by SubLINGual route every five (5) minutes as needed for Chest Pain for up to 3 doses.  19   Es Lo MD       Allergies:  No Known Allergies    Social History:  Social History     Tobacco Use    Smoking status: Former Smoker     Packs/day: 0.50     Years: 10.00     Pack years: 5.00     Types: Cigarettes     Quit date: 1967     Years since quittin.1    Smokeless tobacco: Never Used   Vaping Use    Vaping Use: Never used   Substance Use Topics    Alcohol use: No     Alcohol/week: 0.0 standard drinks    Drug use: No     Types: Prescription       Family History:  Family History   Problem Relation Age of Onset    Hypertension Mother     Preeclampsia Mother     Panic disorder Mother     Heart Disease Neg Hx     Cancer Neg Hx        ROS:      Total of 12 systems reviewed as follows:  POSITIVE= bolded text  Negative = text not bolded       General:  fever, chills, sweats, generalized weakness, weight loss/gain, loss of appetite   Eyes:    blurred vision, eye pain, loss of vision, double vision  ENT:    rhinorrhea, pharyngitis   Respiratory:  cough, sputum production, SOB, WARREN, wheezing, pleuritic pain   Cardiology:   chest pain, palpitations, orthopnea, PND, edema, syncope   Gastrointestinal:  abdominal pain , N/V, diarrhea, dysphagia, constipation, bleeding   Genitourinary:  frequency, urgency, dysuria, hematuria, incontinence, prostatism   Muskuloskeletal: arthralgia, myalgia, back pain  Hematology:   easy bruising, nose or gum bleeding, lymphadenopathy   Dermatological: rash, ulceration, pruritis, color change / jaundice  Endocrine:   hot flashes or polydipsia   Neurological:  headache, dizziness, confusion, focal weakness, paresthesia, speech difficulties, memory loss, gait difficulty  Psychological: feelings of anxiety, depression, agitation      PHYSICAL EXAM:  Patient Vitals for the past 24 hrs:   Temp Pulse Resp BP SpO2   06/29/22 1200 -- 75 -- -- --   06/29/22 1100 -- 94 20 (!) 108/52 96 %   06/29/22 0800 -- 70 -- -- --   06/29/22 0724 97.8 °F (36.6 °C) 76 20 (!) 110/54 96 %   06/29/22 0346 97.6 °F (36.4 °C) 74 18 (!) 113/55 97 %   06/29/22 0002 97.4 °F (36.3 °C) 71 20 (!) 106/50 100 %   06/28/22 2145 -- -- -- -- 98 %   06/28/22 2132 97.5 °F (36.4 °C) 68 20 (!) 110/57 98 %   06/28/22 2027 -- 71 18 (!) 100/54 98 %   06/28/22 2000 -- 60 18 (!) 96/51 97 %   06/28/22 1902 -- 62 18 101/62 96 %   06/28/22 1830 -- 68 18 (!) 99/56 98 %   06/28/22 1800 -- 67 18 (!) 101/57 98 %   06/28/22 1700 -- 67 18 (!) 104/56 98 %   06/28/22 1630 -- 64 18 (!) 102/53 98 %       General:    Alert, cooperative, no distress, appears stated age. HEENT: Atraumatic, anicteric sclerae, pink conjunctivae     No oral ulcers, mucosa moist, throat clear, dentition fair  Neck:  Supple, symmetrical;   thyroid non tender  Lungs:   Clear to auscultation bilaterally. No wheezing or rhonchi. No rales. Chest wall:  No tenderness.   No accessory muscle use.  Heart:   Regular rhythm. No  murmur. No edema  Abdomen:   Soft, non-tender. Not distended. Bowel sounds normal  Extremities: No cyanosis. No clubbing      Capillary refill normal,  Radial pulse 2+,    Skin:     Not pale. Not jaundiced. No rashes   Psych:  Good insight. Not depressed. Not anxious or agitated. Neurologic: EOMs intact. No facial asymmetry. No aphasia or slurred speech. Sensation grossly intact. Alert and oriented X 4. Unable to do SLR bilaterally.      Lab Data Reviewed:    Recent Results (from the past 24 hour(s))   METABOLIC PANEL, BASIC    Collection Time: 06/28/22  4:07 PM   Result Value Ref Range    Sodium 132 (L) 136 - 145 mmol/L    Potassium 5.6 (H) 3.5 - 5.1 mmol/L    Chloride 98 97 - 108 mmol/L    CO2 24 21 - 32 mmol/L    Anion gap 10 5 - 15 mmol/L    Glucose 111 (H) 65 - 100 mg/dL     (H) 6 - 20 MG/DL    Creatinine 3.52 (H) 0.55 - 1.02 MG/DL    BUN/Creatinine ratio 36 (H) 12 - 20      GFR est AA 15 (L) >60 ml/min/1.73m2    GFR est non-AA 12 (L) >60 ml/min/1.73m2    Calcium 9.2 8.5 - 10.1 MG/DL   TROPONIN-HIGH SENSITIVITY    Collection Time: 06/28/22  4:07 PM   Result Value Ref Range    Troponin-High Sensitivity 170 (HH) 0 - 51 ng/L   LACTIC ACID    Collection Time: 06/28/22  6:41 PM   Result Value Ref Range    Lactic acid 1.3 0.4 - 2.0 MMOL/L   EKG, 12 LEAD, SUBSEQUENT    Collection Time: 06/28/22 10:46 PM   Result Value Ref Range    Ventricular Rate 73 BPM    Atrial Rate 73 BPM    P-R Interval 154 ms    QRS Duration 88 ms    Q-T Interval 440 ms    QTC Calculation (Bezet) 484 ms    Calculated P Axis 89 degrees    Calculated R Axis -46 degrees    Calculated T Axis 26 degrees    Diagnosis       Sinus rhythm with marked sinus arrhythmia  Left axis deviation  Abnormal ECG  When compared with ECG of 28-JUN-2022 13:20,  MANUAL COMPARISON REQUIRED, DATA IS UNCONFIRMED     CBC WITH AUTOMATED DIFF    Collection Time: 06/29/22  5:19 AM   Result Value Ref Range    WBC 11.6 (H) 3.6 - 11.0 K/uL    RBC 3.66 (L) 3.80 - 5.20 M/uL    HGB 11.1 (L) 11.5 - 16.0 g/dL    HCT 33.7 (L) 35.0 - 47.0 %    MCV 92.1 80.0 - 99.0 FL    MCH 30.3 26.0 - 34.0 PG    MCHC 32.9 30.0 - 36.5 g/dL    RDW 14.1 11.5 - 14.5 %    PLATELET 731 (L) 957 - 400 K/uL    MPV 12.2 8.9 - 12.9 FL    NRBC 0.5 (H) 0  WBC    ABSOLUTE NRBC 0.06 (H) 0.00 - 0.01 K/uL    NEUTROPHILS 74 32 - 75 %    LYMPHOCYTES 19 12 - 49 %    MONOCYTES 6 5 - 13 %    EOSINOPHILS 0 0 - 7 %    BASOPHILS 0 0 - 1 %    IMMATURE GRANULOCYTES 0 0.0 - 0.5 %    ABS. NEUTROPHILS 8.6 (H) 1.8 - 8.0 K/UL    ABS. LYMPHOCYTES 2.2 0.8 - 3.5 K/UL    ABS. MONOCYTES 0.7 0.0 - 1.0 K/UL    ABS. EOSINOPHILS 0.0 0.0 - 0.4 K/UL    ABS. BASOPHILS 0.0 0.0 - 0.1 K/UL    ABS. IMM. GRANS. 0.0 0.00 - 0.04 K/UL    DF AUTOMATED     METABOLIC PANEL, COMPREHENSIVE    Collection Time: 06/29/22  5:19 AM   Result Value Ref Range    Sodium 137 136 - 145 mmol/L    Potassium 4.8 3.5 - 5.1 mmol/L    Chloride 105 97 - 108 mmol/L    CO2 21 21 - 32 mmol/L    Anion gap 11 5 - 15 mmol/L    Glucose 71 65 - 100 mg/dL     (H) 6 - 20 MG/DL    Creatinine 2.85 (H) 0.55 - 1.02 MG/DL    BUN/Creatinine ratio 37 (H) 12 - 20      GFR est AA 19 (L) >60 ml/min/1.73m2    GFR est non-AA 15 (L) >60 ml/min/1.73m2    Calcium 8.4 (L) 8.5 - 10.1 MG/DL    Bilirubin, total 1.0 0.2 - 1.0 MG/DL    ALT (SGPT) 51 12 - 78 U/L    AST (SGOT) 125 (H) 15 - 37 U/L    Alk.  phosphatase 178 (H) 45 - 117 U/L    Protein, total 6.2 (L) 6.4 - 8.2 g/dL    Albumin 2.0 (L) 3.5 - 5.0 g/dL    Globulin 4.2 (H) 2.0 - 4.0 g/dL    A-G Ratio 0.5 (L) 1.1 - 2.2     MAGNESIUM    Collection Time: 06/29/22  5:19 AM   Result Value Ref Range    Magnesium 1.7 1.6 - 2.4 mg/dL   METABOLIC PANEL, BASIC    Collection Time: 06/29/22 12:14 PM   Result Value Ref Range    Sodium 139 136 - 145 mmol/L    Potassium 4.6 3.5 - 5.1 mmol/L    Chloride 108 97 - 108 mmol/L    CO2 19 (L) 21 - 32 mmol/L    Anion gap 12 5 - 15 mmol/L Glucose 105 (H) 65 - 100 mg/dL    BUN 98 (H) 6 - 20 MG/DL    Creatinine 2.64 (H) 0.55 - 1.02 MG/DL    BUN/Creatinine ratio 37 (H) 12 - 20      GFR est AA 21 (L) >60 ml/min/1.73m2    GFR est non-AA 17 (L) >60 ml/min/1.73m2    Calcium 8.6 8.5 - 10.1 MG/DL   MAGNESIUM    Collection Time: 06/29/22 12:14 PM   Result Value Ref Range    Magnesium 1.7 1.6 - 2.4 mg/dL   TROPONIN-HIGH SENSITIVITY    Collection Time: 06/29/22 12:14 PM   Result Value Ref Range    Troponin-High Sensitivity 159 (HH) 0 - 51 ng/L         Radiology:  CT HEAD WO CONT   Final Result   1. No evidence of intracranial hemorrhage. 2. Evidence of mild, patchy cerebral atrophy. XR CHEST PORT   Final Result   Clear lungs. Care Plan discussed with:   Patient x    Family x    RN x         Consultant      Expected  Disposition:   Home with Family x   HH/PT/OT/RN    SNF/LTC    FITO      TOTAL TIME:  61 Minutes      Comments    x Reviewed previous records   >50% of visit spent in counseling and coordination of care x Discussion with patient and/or family and questions answered       _______________________________________________________  Given the patient's current clinical presentation, I have a high level of concern for decompensation if discharged from the emergency department. Complex decision making was performed, which includes reviewing the patient's available past medical records, laboratory results, and x-ray films. My assessment of this patient's clinical condition and my plan of care is as follows. ASSESSMENT / PLAN    Active Problems:  -Dehydration (6/28/2022)  -Renal failure (6/28/2022)  -Elevated troponin level  -Hyponatremia  -Hyperkalemia.   -Chronic pain. IV fluids. Recheck labs. Speech and swallow evaluation. Consider hospice. PT/OT. Tramadol/ tylenol.              SAFETY:   Code Status:DNR  DVT prophylaxis:No VTE Prophylaxis Needed  Stress Ulcer prophylaxis: Protonix    Family Contact Info:  Primary Emergency ContactDesawyer Collier, Home Phone: [phone]: [facility] Room 126/01  Disposition: TBD, likely home when stable  Admission status:  Inpatient    -Tentative plan of care discussed with patient / family, who demonstrated understanding and is in agreement to the above  -Case was reviewed with the ED Provider, Larance Cockayne, MD Maryclare Dada, MD  [facility] Hospitalist  [phone]

## 2022-06-29 NOTE — ED NOTES
TRANSFER - OUT REPORT:    Verbal report given to Annelise MÉNDEZ(name) on Harman Pavon  being transferred to [facility] Bed 126(unit) for routine progression of care       Report consisted of patients Situation, Background, Assessment and   Recommendations(SBAR). Information from the following report(s) SBAR, ED Summary, Intake/Output, MAR, Accordion, Recent Results, Med Rec Status, Cardiac Rhythm NSR/A-fib and Quality Measures was reviewed with the receiving nurse. Lines:   Peripheral IV 06/28/22 Right Hand (Active)   Site Assessment Clean, dry, & intact 06/28/22 1308   Phlebitis Assessment 0 06/28/22 1308   Infiltration Assessment 0 06/28/22 1308   Dressing Status Clean, dry, & intact 06/28/22 1308   Dressing Type Transparent 06/28/22 1308   Hub Color/Line Status Blue 06/28/22 1308   Alcohol Cap Used No 06/28/22 1308        Opportunity for questions and clarification was provided. Patient transported with:   [place] RN to [employer] on remote telemetry box, signal received at [facility] in [facility]

## 2022-06-29 NOTE — ED NOTES
Pt reports she is in a lot of pain at this time, normally gets tramadol 50mg TID, would like her nightly dose. ED Provider to be informed when available.

## 2022-06-29 NOTE — PROGRESS NOTES
Bedside shift change report given to KATIA Carcamo (oncoming nurse) by Marlene Martinez (offgoing nurse). Report included the following information SBAR, Kardex and Intake/Output.

## 2022-06-29 NOTE — PROGRESS NOTES
Plan is that durable POA and Advance Directive will be completed tomorrow when son is here 6/30/22. We will have a notary available for the POA. Per request, Mrs. Trisha Beal referred to [facility]. I called their Hospice Liaison Coleta Lennox and she is going to contact Mrs. Liz Sensor son Sonal Floyd. I will fax [facility] referral information.   Niharika Garza and fax [phone]

## 2022-06-29 NOTE — TELEPHONE ENCOUNTER
CT head Chest/abd/pelvis and bone scan  Suggest the known breast cancer and axillary masses and extensive    He has an appointment with me on 7/15/2022 and Dr Tino Ladd on 7/18/2022      They are uncertain regarding how aggressive of care they wish to choose    They leaning toward hospice    Pradeep Liu MD FACS

## 2022-06-29 NOTE — PROGRESS NOTES
Problem: Pressure Injury - Risk of  Goal: *Prevention of pressure injury  Description: Document Richar Scale and appropriate interventions in the flowsheet. Outcome: Progressing Towards Goal  Note: Pressure Injury Interventions:       Moisture Interventions: Absorbent underpads,Apply protective barrier, creams and emollients,Internal/External urinary devices    Activity Interventions: Assess need for specialty bed    Mobility Interventions: Assess need for specialty bed,HOB 30 degrees or less,PT/OT evaluation                          Problem: Patient Education: Go to Patient Education Activity  Goal: Patient/Family Education  Outcome: Progressing Towards Goal     Problem: Aspiration - Risk of  Goal: *Absence of aspiration  Outcome: Progressing Towards Goal     Problem: Patient Education: Go to Patient Education Activity  Goal: Patient/Family Education  Outcome: Progressing Towards Goal     Problem: Falls - Risk of  Goal: *Absence of Falls  Description: Document Fermin Fall Risk and appropriate interventions in the flowsheet.   Outcome: Progressing Towards Goal  Note: Fall Risk Interventions:  Mobility Interventions: Bed/chair exit alarm,Patient to call before getting OOB,PT Consult for mobility concerns         Medication Interventions: Assess postural VS orthostatic hypotension,Evaluate medications/consider consulting pharmacy,Teach patient to arise slowly    Elimination Interventions: Call light in reach,Patient to call for help with toileting needs    History of Falls Interventions: Bed/chair exit alarm,Door open when patient unattended,Room close to nurse's station         Problem: Patient Education: Go to Patient Education Activity  Goal: Patient/Family Education  Outcome: Progressing Towards Goal

## 2022-06-29 NOTE — PROGRESS NOTES
SPEECH PATHOLOGY BEDSIDE SWALLOW EVALUATION/DISCHARGE  Patient: Ramone Cervantes (59 y.o. female)  Date: 6/29/2022  Primary Diagnosis: Renal failure [N19]  Dehydration [E86.0]       Precautions:        ASSESSMENT :  Based on the objective data described below, the patient presents with a functional oropharyngeal swallow with all consistencies tried. Patient admitted following choking episode in cafeteria, in which the heimlich maneuver was utilized and RRT was called. Patient denies history of swallow issues and reports she eats a Regular/Thin Liquid diet at baseline. On this date, patient tolerated all consistencies without difficulty and without signs of aspiration. XR Chest on 6/28 showed \"the lungs are clear. \"    Recommend Regular/Thin Liquids with general aspiration precautions outlined below. RN educated re: SLP evaluation. Skilled acute therapy provided by a speech-language pathologist is not indicated at this time. PLAN :  Recommendations:  -- Regular/Thin Liquids  -- Medication as Tolerated  -- Sitting Upright with all PO  -- Small Bites/Sips    Discharge Recommendations: None from SLP standpoint     SUBJECTIVE:   Patient's family present at bedside. OBJECTIVE:     Past Medical History:   Diagnosis Date    Anticoagulation goal of INR 1.5 to 2.5     Breast cancer ([address] 2022    breast ca    [facility] [address]     Hyperlipemia     Hypertension     Left leg DVT ([address] 07/2019    Current DVT in same leg.     Obesity      Past Surgical History:   Procedure Laterality Date    HX APPENDECTOMY      HX HYSTERECTOMY      US BX BREAST RT 1ST LESION W/CLIP AND SPECIMEN Right 2022    and node right ax node both breast CA     Prior Level of Function/Home Situation:   Home Situation  Home Environment: Private residence  # Steps to Enter: 0  One/Two Story Residence: One story  Living Alone: No  Support Systems: Child(james)  Patient Expects to be Discharged to[de-identified] Home  Current DME Used/Available at Home: Wheelchair    Diet prior to admission: Regular/Thin Liquid  Current Diet: NPO    Cognitive and Communication Status:  Neurologic State: Alert  Orientation Level: Oriented X4  Cognition: Appropriate for age attention/concentration,Follows commands  Perception: Appears intact  Perseveration: No perseveration noted  Safety/Judgement: Insight into deficits,Awareness of environment    Oral Assessment:  Oral Assessment  Labial: No impairment  Dentition: Natural  Oral Hygiene: Moist oral mucosa  Lingual: No impairment  Velum: Unable to visualize  Mandible: No impairment    P.O. Trials:  Patient Position: Upright in bed  Vocal quality prior to P.O.: No impairment  Consistency Presented: Thin liquid;Puree; Solid  How Presented: Self-fed/presented;Straw;Successive swallows;Spoon     Bolus Acceptance: No impairment  Bolus Formation/Control: No impairment     Propulsion: No impairment  Oral Residue: None  Initiation of Swallow: No impairment  Laryngeal Elevation: Functional  Aspiration Signs/Symptoms: None  Pharyngeal Phase Characteristics: No impairment, issues, or problems              Oral Phase Severity: Other (comment) (WFL)  Pharyngeal Phase Severity : Other (comment) (WFL)    NOMS:   The NOMS functional outcome measure was used to quantify this patient's level of swallowing impairment. Based on the NOMS, the patient was determined to be at level 7 for swallow function. NOMS Swallowing Levels:  Level 1 (CN): NPO  Level 2 (CM): NPO but takes consistency in therapy  Level 3 (CL): Takes less than 50% of nutrition p.o. and continues with nonoral feedings; and/or safe with mod cues; and/or max diet restriction  Level 4 (CK):  Safe swallow but needs mod cues; and/or mod diet restriction; and/or still requires some nonoral feeding/supplements  Level 5 (CJ): Safe swallow with min diet restriction; and/or needs min cues  Level 6 (CI): Independent with p.o.; rare cues; usually self cues; may need to avoid some foods or needs extra time  Level 7 ([address] [place] [address]): Independent for all p.o.  BABITA. (2003). National Outcomes Measurement System (NOMS): Adult Speech-Language Pathology User's Guide. Pain:  Pain Scale 1: Numeric (0 - 10)  Pain Intensity 1: 0       After treatment:   Patient left in no apparent distress in bed, Call bell within reach, Nursing notified and Caregiver / family present    COMMUNICATION/EDUCATION:   Patient was educated regarding her WFL swallow. She demonstrated good understanding as evidenced by verbalizing understanding. The patient's plan of care including recommendations, planned interventions, and recommended diet changes were discussed with: Registered nurse.      Thank you for this referral.  SATYA Dyer  Time Calculation: 15 mins

## 2022-06-29 NOTE — PROGRESS NOTES
Physical Therapy Screening:    [facility] based on results obtained during the nursing admission assessment. The patients chart was reviewed and the patient is appropriate for a skilled therapy evaluation if there is a decline in functional mobility from baseline. Per ED nursing notes regarding prior level of function  \" Pt non-ambulatory at baseline\". Please order a consult for physical therapy if skilled PT is indicated and you would like an evaluation to be completed. Thank you.     Daljit Peralta, PT

## 2022-06-30 NOTE — PROGRESS NOTES
Problem: Pressure Injury - Risk of  Goal: *Prevention of pressure injury  Description: Document Richar Scale and appropriate interventions in the flowsheet. Outcome: Progressing Towards Goal  Note: Pressure Injury Interventions:       Moisture Interventions: Absorbent underpads,Apply protective barrier, creams and emollients,Internal/External urinary devices    Activity Interventions: Chair cushion,Increase time out of bed,Pressure redistribution bed/mattress(bed type)    Mobility Interventions: HOB 30 degrees or less    Nutrition Interventions: Document food/fluid/supplement intake    Friction and Shear Interventions: Apply protective barrier, creams and emollients,HOB 30 degrees or less                Problem: Patient Education: Go to Patient Education Activity  Goal: Patient/Family Education  Outcome: Progressing Towards Goal     Problem: Aspiration - Risk of  Goal: *Absence of aspiration  Outcome: Progressing Towards Goal     Problem: Falls - Risk of  Goal: *Absence of Falls  Description: Document Fermin Fall Risk and appropriate interventions in the flowsheet.   Outcome: Progressing Towards Goal  Note: Fall Risk Interventions:  Mobility Interventions: Bed/chair exit alarm,OT consult for ADLs,Patient to call before getting OOB,PT Consult for mobility concerns         Medication Interventions: Bed/chair exit alarm,Evaluate medications/consider consulting pharmacy,Patient to call before getting OOB,Teach patient to arise slowly    Elimination Interventions: Call light in reach,Patient to call for help with toileting needs,Stay With Me (per policy)    History of Falls Interventions: Bed/chair exit alarm,Door open when patient unattended,Room close to nurse's station

## 2022-06-30 NOTE — DISCHARGE INSTRUCTIONS
Patient Education        Dehydration: Care Instructions  Your Care Instructions  Dehydration happens when your body loses too much fluid. This might happen when you do not drink enough water or you lose large amounts of fluids from your body because of diarrhea, vomiting, or sweating. Severe dehydration can be life-threatening. Water and minerals called electrolytes help put your body fluids back in balance. Learn the early signs of fluid loss, and drink more fluids to prevent dehydration. Follow-up care is a key part of your treatment and safety. Be sure to make and go to all appointments, and call your doctor if you are having problems. It's also a good idea to know your test results and keep a list of the medicines you take. How can you care for yourself at home? · Drink plenty of fluids. Choose water and other clear liquids until you feel better. If you have kidney, heart, or liver disease and have to limit fluids, talk with your doctor before you increase the amount of fluids you drink. · If you do not feel like eating or drinking, try taking small sips of water, sports drinks, or other rehydration drinks. · Get plenty of rest.  To prevent dehydration  · Add more fluids to your diet and daily routine, unless your doctor has told you not to. · During hot weather, drink more fluids. Drink even more fluids if you exercise a lot. Stay away from drinks with alcohol. · Watch for the symptoms of dehydration. These include:  ? A dry, sticky mouth. ? Not much urine. ? Dry and sunken eyes. ? Feeling very tired. · Learn what problems can lead to dehydration. These include:  ? Diarrhea, fever, and vomiting. ? Any illness with a fever, such as pneumonia or the flu. ? Activities that cause heavy sweating, such as endurance races and heavy outdoor work in hot or humid weather. ?  Certain medicines, such as cold and allergy pills (antihistamines), pills that remove water from the body (diuretics), and laxatives. ? Certain diseases, such as diabetes, cancer, and heart or kidney disease. When should you call for help? Call 911 anytime you think you may need emergency care. For example, call if:    · You passed out (lost consciousness). Call your doctor now or seek immediate medical care if:    · You are confused and cannot think clearly.     · You are dizzy or lightheaded, or you feel like you may faint.     · You have signs of needing more fluids. You have sunken eyes, a dry mouth, and you pass only a little urine.     · You cannot keep fluids down.     · You have diarrhea that lasts for more than a few days. Watch closely for changes in your health, and be sure to contact your doctor if:    · You are not making tears.     · Your skin is very dry and sags slowly back into place after you pinch it.     · Your mouth and eyes are very dry. Where can you learn more? Go to http://garrett-fay.info/  Enter Q814 in the search box to learn more about \"Dehydration: Care Instructions. \"  Current as of: July 1, 2021               Content Version: 13.2  © 4686-3126 [employer]. Care instructions adapted under license by [employer] (which disclaims liability or warranty for this information). If you have questions about a medical condition or this instruction, always ask your healthcare professional. [address] any warranty or liability for your use of this information.

## 2022-06-30 NOTE — PROGRESS NOTES
Bedside and Verbal shift change report given to SARAY Abdul RN (oncoming nurse) by Missael Palumbo RN (offgoing nurse). Report included the following information SBAR, Kardex, Intake/Output, MAR and Recent Results.

## 2022-06-30 NOTE — DISCHARGE SUMMARY
[facility]  Hospitalist Discharge Summary    Patient ID:  Sha Cheung  [id number]  80 y.o.  3/5/1930    PCP on record: Shari Mayers MD    Admit date: 6/28/2022  Discharge date and time: 6/30/2022     DISCHARGE DIAGNOSIS:    Active Problems:    Dehydration (6/28/2022)      Renal failure (6/28/2022)      Elevated troponin    CONSULTATIONS:  None    Excerpted HPI from H&P of Mark Pena MD:  Patient is a pleasant 80year old female who had a choking episode with some vomiting, and was seen in the ER and found to be dehydrated. She was also found to have elevated troponin level and her BUN and creatinine also elevated. During her stay at the hospital, she was given IV fluids. Her numbers started improving. She was also given medication for pain, and constipation. She had a bowel movement yesterday. She was able to eat her meals. She took her medications as prescribed. Today she is hemodynamically stable and ready for discharge. She will be       ______________________________________________________________________  DISCHARGE SUMMARY/HOSPITAL COURSE:  for full details see H&P, daily progress notes, labs, consult notes. Active Problems:    Dehydration (6/28/2022)    Renal failure (6/28/2022)    Elevated troponin        Encouraged to increase oral fluids. Continue home medications. PER REQUEST,  HOSPICE WAS CONSULTED. THEY WILL FOLLOW UP WITH PATIENT AS SOON AS SHE IS DISCHARGED.         _______________________________________________________________________  Patient seen and examined by me on discharge day.   Pertinent Findings:  Visit Vitals  BP (!) 110/56 (BP 1 Location: Left upper arm, BP Patient Position: At rest)   Pulse 71   Temp 97.3 °F (36.3 °C)   Resp 20   Ht 4' 8\" (1.422 m)   Wt 79.5 kg (175 lb 3.2 oz)   SpO2 97%   Breastfeeding No   BMI 39.28 kg/m²     Gen:    Not in distress  Chest: Nonlabored respiration, Clear lungs  CVS:   Regular rhythm. No edema  Abd:  Soft, not distended, not tender  Neuro:  Alert, non focal.  Sensation grossly intact.    _______________________________________________________________________  DISCHARGE MEDICATIONS:   Current Discharge Medication List      CONTINUE these medications which have NOT CHANGED    Details   carvediloL (COREG) 25 mg tablet TAKE 1 TABLET BY MOUTH 2 TIMES A DAY FOR HEART AND PRESSURE  Qty: 180 Tablet, Refills: 3    Associated Diagnoses: Essential hypertension; Coronary artery disease involving native coronary artery without angina pectoris      spironolactone (ALDACTONE) 50 mg tablet TAKE 1 TABLET BY MOUTH DAILY  Qty: 90 Tablet, Refills: 3    Associated Diagnoses: Coronary artery disease involving native coronary artery of native heart without angina pectoris; Essential hypertension      traMADoL (ULTRAM) 50 mg tablet Take 1 Tablet by mouth three (3) times daily as needed for Pain for up to 15 days. Max Daily Amount: 150 mg. Indications: cancer pain  Qty: 45 Tablet, Refills: 0    Associated Diagnoses: Mass of lower outer quadrant of right breast; Breast pain, right      amLODIPine (NORVASC) 2.5 mg tablet TAKE 1 TABLET BY MOUTH EVERY DAY FOR BLOOD PRESSURE  Qty: 90 Tablet, Refills: 3    Associated Diagnoses: Coronary artery disease involving native coronary artery of native heart without angina pectoris; Essential hypertension      atorvastatin (LIPITOR) 40 mg tablet TAKE 1 TABLET BY MOUTH EVERY DAY  Qty: 90 Tablet, Refills: 3    Associated Diagnoses: Pure hypercholesterolemia      bumetanide (BUMEX) 1 mg tablet Take 1 Tablet by mouth daily. Indications: fluid/ edema  Qty: 90 Tablet, Refills: 3    Comments: New lower dose  Associated Diagnoses: Lower extremity edema      ferrous sulfate 325 mg (65 mg iron) tablet Take 1 Tablet by mouth daily. Qty: 90 Tablet, Refills: 3    Associated Diagnoses: History of GI bleed;  Other iron deficiency anemia      pantoprazole (PROTONIX) 40 mg tablet Take 1 Tablet by mouth daily. Qty: 90 Tablet, Refills: 3    Associated Diagnoses: History of GI bleed; Other iron deficiency anemia      clopidogreL (PLAVIX) 75 mg tab TAKE 1 TABLET BY MOUTH EVERY DAY to prevent heart attack and stroke  Qty: 90 Tablet, Refills: 3    Associated Diagnoses: Coronary artery disease involving native coronary artery of native heart without angina pectoris      Walker misc 1 Each by Does Not Apply route daily. Needs a byron walker due to low height  Qty: 1 Each, Refills: 0    Associated Diagnoses: Primary osteoarthritis of both knees      diclofenac (VOLTAREN) 1 % gel Apply  to affected area four (4) times daily. Qty: 100 g, Refills: 4    Associated Diagnoses: Primary osteoarthritis of both knees      nitroglycerin (NITROSTAT) 0.4 mg SL tablet 1 Tab by SubLINGual route every five (5) minutes as needed for Chest Pain for up to 3 doses.   Qty: 20 Tab, Refills: 1    Associated Diagnoses: Stable angina ([address]             My Recommended  Diet: Cardiac  Activity: Up only with assistance  Wound Care: none  Follow-up labs: none     ______________________________________________________________________  DISPOSITION:    Home with Family: y   Home with HH/PT/OT/RN:    SNF/LTC:    FITO:    OTHER:        Condition at Discharge:  Stable  _____________________________________________________________________  Follow up with:   PCP : Deborah Galvin MD  Follow-up Information     Follow up With Specialties Details Why Contact Info    Deborah Galvin MD Family Medicine   [address] [place] [address]  [phone]                Total time in minutes spent coordinating this discharge (includes going over instructions, follow-up, prescriptions, and preparing report for sign off to her PCP) :35 minutes    Signed:  Harley Salcedo MD  [facility] Hospitalist  [phone]

## 2022-06-30 NOTE — PROGRESS NOTES
Bedside shift change report given to Ryan Hilario (oncoming nurse) by Kayy Parra (offgoing nurse). Report included the following information SBAR, Kardex and MAR.

## 2022-06-30 NOTE — PROGRESS NOTES
Assistance with Durable POA, Advance Medical Directive and DNR documents this morning per patient request. Original and copies of documents given to patient/son [facility]. Copies to chart.

## 2022-06-30 NOTE — PROGRESS NOTES
Discharge instructions reviewed with patient and son  Personal belongings returned: clothing  Home meds returned: N/A  To front entrance via [address]  Discharged home with son @ [id number]

## 2022-06-30 NOTE — PROGRESS NOTES
Care Management Interventions  PCP Verified by CM: Yes (Dr. King Hernandez MD)  Last Visit to PCP: 06/17/22  Palliative Care Criteria Met (RRAT>21 & CHF Dx)?: No  Mode of Transport at Discharge: Other (see comment) (POV/Son)  Transition of Care Consult (CM Consult): Discharge Planning  MyChart Signup: No  Discharge Durable Medical Equipment: No  Physical Therapy Consult: No  Occupational Therapy Consult: No  Speech Therapy Consult: Yes  Support Systems: Child(james)  Confirm Follow Up Transport: Self  [place] Resource Information Provided?: No  Discharge Location  Patient Expects to be Discharged to[de-identified] Home     Mrs. Tahmina Blanchard is discharged to home today 6/30/22 and will be receiving hospice care at home through [facility]. The hospice liaison Autumn Davey will be meeting Mrs. Tahmina Blanchard and her son Emily Fontaine at the home. [facility] will be transporting Mrs. Tahmina Blanchard. Advance Directive, Durable POA, DNR order completed this morning. Resources given for private duty/personal care in home help. Transition of Care (DUNCAN) Plan:      RUR: 19% Medium    DUNCAN Transportation:       How is patient being transported at discharge? Son/POV     When? 06/30/22     Is transport scheduled? N/A    Follow-up appointment and transportation: [address] [place] [address]     PCP?  Jack Montague MD      Click here to complete [address] including selection of the Healthcare Decision Maker Relationship (ie \"Primary\")  @[facility]   Kari Borges, [phone]

## 2022-06-30 NOTE — PROGRESS NOTES
Spiritual Care Assessment/Progress Note  Mario Hernandez      NAME: Harman Pavon      MRN: [id number]  AGE: 80 y.o.  SEX: female  Rastafarian Affiliation: No preference   Language: English     6/30/2022     Total Time (in minutes): 8     Spiritual Assessment begun in [facility] through conversation with:         [x]Patient        [] Family    [] Friend(s)        Reason for Consult: Initial/Spiritual assessment, patient floor     Spiritual beliefs: (Please include comment if needed)     [x] Identifies with a brooks tradition:         [] Supported by a brooks community:            [] Claims no spiritual orientation:           [] Seeking spiritual identity:                [] Adheres to an individual form of spirituality:           [] Not able to assess:                           Identified resources for coping:      [] Prayer                               [] Music                  [] Guided Imagery     [x] Family/friends                 [] Pet visits     [] Devotional reading                         [] Unknown     [] Other:                                            Interventions offered during this visit: (See comments for more details)    Patient Interventions: Affirmation of emotions/emotional suffering,Affirmation of brooks,Iconic (affirming the presence of God/Higher Power),Initial/Spiritual assessment, patient floor,Prayer (assurance of)           Plan of Care:     [x] Support spiritual and/or cultural needs    [] Support AMD and/or advance care planning process      [] Support grieving process   [] Coordinate Rites and/or Rituals    [] Coordination with community clergy   [] No spiritual needs identified at this time   [] Detailed Plan of Care below (See Comments)  [] Make referral to Music Therapy  [] Make referral to Pet Therapy     [] Make referral to Addiction services  [] Make referral to [facility]  [] Make referral to Spiritual Care Partner  [] No future visits requested        [] Contact Spiritual Care for further referrals     Comments:Initial spiritual assessment in R[address] 126  Patient was happy to be leaving  Provided empathic listening and spiritual support  Advised of  Availability   [address] [place] [address]

## 2022-07-01 NOTE — TELEPHONE ENCOUNTER
DANIS  Telephone call from [address] [place] [address] Ne calling to inform Dr. Madalyn Xavier that they have started care on Thurs., June 30th with pt, they have not changed any meds, her  is [facility] Maker and they will be keeping Dr. Madalyn Xavier informed.   Thanks,

## 2022-07-01 NOTE — TELEPHONE ENCOUNTER
DANIS  Telephone call from [address] [place] [address] Ne calling to inform Dr. Malvina Boast that they have started care on Thurs., June 30th with pt, they have not changed any meds, her  is Pietro Pearson and they will be keeping Dr. Malvina Boast informed.   Thanks,

## 2022-07-30 PROBLEM — E86.0 DEHYDRATION: Status: RESOLVED | Noted: 2022-06-28 | Resolved: 2022-01-01

## 2024-03-14 ENCOUNTER — TELEPHONE (OUTPATIENT)
Facility: CLINIC | Age: 89
End: 2024-03-14